# Patient Record
Sex: MALE | Race: BLACK OR AFRICAN AMERICAN | NOT HISPANIC OR LATINO | URBAN - METROPOLITAN AREA
[De-identification: names, ages, dates, MRNs, and addresses within clinical notes are randomized per-mention and may not be internally consistent; named-entity substitution may affect disease eponyms.]

---

## 2023-09-21 ENCOUNTER — EMERGENCY (EMERGENCY)
Facility: HOSPITAL | Age: 48
LOS: 1 days | Discharge: HOME | End: 2023-09-21
Admitting: EMERGENCY MEDICINE
Payer: MEDICAID

## 2023-09-21 VITALS
HEART RATE: 72 BPM | SYSTOLIC BLOOD PRESSURE: 156 MMHG | TEMPERATURE: 99 F | OXYGEN SATURATION: 98 % | DIASTOLIC BLOOD PRESSURE: 86 MMHG | RESPIRATION RATE: 18 BRPM

## 2023-09-21 VITALS
HEART RATE: 60 BPM | OXYGEN SATURATION: 100 % | TEMPERATURE: 98 F | RESPIRATION RATE: 18 BRPM | SYSTOLIC BLOOD PRESSURE: 173 MMHG | DIASTOLIC BLOOD PRESSURE: 95 MMHG | WEIGHT: 139.99 LBS

## 2023-09-21 LAB
ALBUMIN SERPL ELPH-MCNC: 3.6 G/DL — SIGNIFICANT CHANGE UP (ref 3.4–5)
ALP SERPL-CCNC: 72 U/L — SIGNIFICANT CHANGE UP (ref 40–120)
ALT FLD-CCNC: 22 U/L — SIGNIFICANT CHANGE UP (ref 12–42)
ANION GAP SERPL CALC-SCNC: 9 MMOL/L — SIGNIFICANT CHANGE UP (ref 9–16)
AST SERPL-CCNC: 28 U/L — SIGNIFICANT CHANGE UP (ref 15–37)
BASOPHILS # BLD AUTO: 0.03 K/UL — SIGNIFICANT CHANGE UP (ref 0–0.2)
BASOPHILS NFR BLD AUTO: 0.5 % — SIGNIFICANT CHANGE UP (ref 0–2)
BILIRUB SERPL-MCNC: 0.3 MG/DL — SIGNIFICANT CHANGE UP (ref 0.2–1.2)
BUN SERPL-MCNC: 10 MG/DL — SIGNIFICANT CHANGE UP (ref 7–23)
CALCIUM SERPL-MCNC: 9.2 MG/DL — SIGNIFICANT CHANGE UP (ref 8.5–10.5)
CHLORIDE SERPL-SCNC: 103 MMOL/L — SIGNIFICANT CHANGE UP (ref 96–108)
CO2 SERPL-SCNC: 27 MMOL/L — SIGNIFICANT CHANGE UP (ref 22–31)
CREAT SERPL-MCNC: 1.04 MG/DL — SIGNIFICANT CHANGE UP (ref 0.5–1.3)
EGFR: 87 ML/MIN/1.73M2 — SIGNIFICANT CHANGE UP
EOSINOPHIL # BLD AUTO: 0.03 K/UL — SIGNIFICANT CHANGE UP (ref 0–0.5)
EOSINOPHIL NFR BLD AUTO: 0.5 % — SIGNIFICANT CHANGE UP (ref 0–6)
GLUCOSE SERPL-MCNC: 101 MG/DL — HIGH (ref 70–99)
HCT VFR BLD CALC: 50.8 % — HIGH (ref 39–50)
HGB BLD-MCNC: 16.7 G/DL — SIGNIFICANT CHANGE UP (ref 13–17)
IMM GRANULOCYTES NFR BLD AUTO: 0.3 % — SIGNIFICANT CHANGE UP (ref 0–0.9)
LYMPHOCYTES # BLD AUTO: 1.03 K/UL — SIGNIFICANT CHANGE UP (ref 1–3.3)
LYMPHOCYTES # BLD AUTO: 17 % — SIGNIFICANT CHANGE UP (ref 13–44)
MCHC RBC-ENTMCNC: 29.3 PG — SIGNIFICANT CHANGE UP (ref 27–34)
MCHC RBC-ENTMCNC: 32.9 GM/DL — SIGNIFICANT CHANGE UP (ref 32–36)
MCV RBC AUTO: 89.3 FL — SIGNIFICANT CHANGE UP (ref 80–100)
MONOCYTES # BLD AUTO: 0.29 K/UL — SIGNIFICANT CHANGE UP (ref 0–0.9)
MONOCYTES NFR BLD AUTO: 4.8 % — SIGNIFICANT CHANGE UP (ref 2–14)
NEUTROPHILS # BLD AUTO: 4.67 K/UL — SIGNIFICANT CHANGE UP (ref 1.8–7.4)
NEUTROPHILS NFR BLD AUTO: 76.9 % — SIGNIFICANT CHANGE UP (ref 43–77)
NRBC # BLD: 0 /100 WBCS — SIGNIFICANT CHANGE UP (ref 0–0)
PLATELET # BLD AUTO: 257 K/UL — SIGNIFICANT CHANGE UP (ref 150–400)
POTASSIUM SERPL-MCNC: 3.8 MMOL/L — SIGNIFICANT CHANGE UP (ref 3.5–5.3)
POTASSIUM SERPL-SCNC: 3.8 MMOL/L — SIGNIFICANT CHANGE UP (ref 3.5–5.3)
PROT SERPL-MCNC: 7.9 G/DL — SIGNIFICANT CHANGE UP (ref 6.4–8.2)
RBC # BLD: 5.69 M/UL — SIGNIFICANT CHANGE UP (ref 4.2–5.8)
RBC # FLD: 12.4 % — SIGNIFICANT CHANGE UP (ref 10.3–14.5)
SODIUM SERPL-SCNC: 139 MMOL/L — SIGNIFICANT CHANGE UP (ref 132–145)
WBC # BLD: 6.07 K/UL — SIGNIFICANT CHANGE UP (ref 3.8–10.5)
WBC # FLD AUTO: 6.07 K/UL — SIGNIFICANT CHANGE UP (ref 3.8–10.5)

## 2023-09-21 PROCEDURE — 99284 EMERGENCY DEPT VISIT MOD MDM: CPT

## 2023-09-21 RX ORDER — SODIUM CHLORIDE 9 MG/ML
1000 INJECTION INTRAMUSCULAR; INTRAVENOUS; SUBCUTANEOUS ONCE
Refills: 0 | Status: COMPLETED | OUTPATIENT
Start: 2023-09-21 | End: 2023-09-21

## 2023-09-21 RX ORDER — SUCRALFATE 1 G
1 TABLET ORAL ONCE
Refills: 0 | Status: COMPLETED | OUTPATIENT
Start: 2023-09-21 | End: 2023-09-21

## 2023-09-21 RX ORDER — HALOPERIDOL DECANOATE 100 MG/ML
2 INJECTION INTRAMUSCULAR ONCE
Refills: 0 | Status: COMPLETED | OUTPATIENT
Start: 2023-09-21 | End: 2023-09-21

## 2023-09-21 RX ORDER — FAMOTIDINE 10 MG/ML
20 INJECTION INTRAVENOUS ONCE
Refills: 0 | Status: COMPLETED | OUTPATIENT
Start: 2023-09-21 | End: 2023-09-21

## 2023-09-21 RX ADMIN — HALOPERIDOL DECANOATE 2 MILLIGRAM(S): 100 INJECTION INTRAMUSCULAR at 14:07

## 2023-09-21 RX ADMIN — SODIUM CHLORIDE 1000 MILLILITER(S): 9 INJECTION INTRAMUSCULAR; INTRAVENOUS; SUBCUTANEOUS at 14:07

## 2023-09-21 RX ADMIN — FAMOTIDINE 20 MILLIGRAM(S): 10 INJECTION INTRAVENOUS at 14:07

## 2023-09-21 NOTE — ED PROVIDER NOTE - PATIENT PORTAL LINK FT
You can access the FollowMyHealth Patient Portal offered by [facility] by registering at the following website: [email or web address]. By joining [employer]’s FollowMyHealth portal, you will also be able to view your health information using other applications (apps) compatible with our system.

## 2023-09-21 NOTE — ED PROVIDER NOTE - PHYSICAL EXAMINATION
VITAL SIGNS: I have reviewed nursing notes and confirm.   CONST: No apparent distress.  ENT: No nasal discharge; airway clear.  EYES: Sclera clear. Pupils round and symmetrical bilaterally.  RESP: Breathing comfortably; speaking in full sentences.   ABD: Soft; non-distended; non-tender;   MSK: No acute deformities noted to extremities. No tenderness to cervical/thoracic/lumbar spine to palpation.  NEURO: Alert, oriented. Speech is fluent and appropriate. Moving all extremities appropriately. No gross motor or sensory abnormalities.   SKIN: Skin is normal temperature; no diaphoresis; no pallor.   PSYCH: Appropriate mood, language, and behavior.

## 2023-09-21 NOTE — ED PROVIDER NOTE - OBJECTIVE STATEMENT
47 y/o M with Hx of ulcers and HTN, presents for vomiting and abdominal pain since this morning. Patient endorses smoking marijuana everyday. Denies diarrhea.

## 2023-09-21 NOTE — ED PROVIDER NOTE - CLINICAL SUMMARY MEDICAL DECISION MAKING FREE TEXT BOX
49 y/o M presents for abdominal pain and vomiting since this morning. Exam within normal limits. Plan for haldol and Pepcid for symptomatic relief. 49 y/o M presents for abdominal pain and vomiting since this morning in the setting of cannabis use. Exam within normal limits. Plan for haldol and Pepcid for symptomatic relief.

## 2023-09-21 NOTE — ED ADULT NURSE NOTE - OBJECTIVE STATEMENT
Patient presents with complaints of abdominal pain, nausea and actively vomiting in the ED. Endorses smoking marijuana everyday. Denies fever, chills, SOB, CP.

## 2023-09-21 NOTE — ED ADULT TRIAGE NOTE - CHIEF COMPLAINT QUOTE
here for nausea/vomiting- pt actively vomiting in triage and forcing himself to vomit- h/o htn asthma

## 2023-09-23 DIAGNOSIS — I10 ESSENTIAL (PRIMARY) HYPERTENSION: ICD-10-CM

## 2023-09-23 DIAGNOSIS — R10.9 UNSPECIFIED ABDOMINAL PAIN: ICD-10-CM

## 2023-09-23 DIAGNOSIS — R11.2 NAUSEA WITH VOMITING, UNSPECIFIED: ICD-10-CM

## 2023-09-23 DIAGNOSIS — F12.90 CANNABIS USE, UNSPECIFIED, UNCOMPLICATED: ICD-10-CM

## 2023-09-23 DIAGNOSIS — Z87.11 PERSONAL HISTORY OF PEPTIC ULCER DISEASE: ICD-10-CM

## 2024-03-30 ENCOUNTER — EMERGENCY (EMERGENCY)
Facility: HOSPITAL | Age: 49
LOS: 1 days | Discharge: HOME | End: 2024-03-30
Attending: STUDENT IN AN ORGANIZED HEALTH CARE EDUCATION/TRAINING PROGRAM | Admitting: STUDENT IN AN ORGANIZED HEALTH CARE EDUCATION/TRAINING PROGRAM
Payer: COMMERCIAL

## 2024-03-30 ENCOUNTER — EMERGENCY (EMERGENCY)
Facility: HOSPITAL | Age: 49
LOS: 1 days | Discharge: ANOTHER HOSPITAL | End: 2024-03-30
Attending: EMERGENCY MEDICINE | Admitting: EMERGENCY MEDICINE
Payer: MEDICAID

## 2024-03-30 VITALS
RESPIRATION RATE: 16 BRPM | OXYGEN SATURATION: 98 % | HEART RATE: 88 BPM | SYSTOLIC BLOOD PRESSURE: 176 MMHG | TEMPERATURE: 98 F | DIASTOLIC BLOOD PRESSURE: 92 MMHG

## 2024-03-30 VITALS
HEART RATE: 61 BPM | RESPIRATION RATE: 16 BRPM | WEIGHT: 138.01 LBS | DIASTOLIC BLOOD PRESSURE: 110 MMHG | SYSTOLIC BLOOD PRESSURE: 213 MMHG | OXYGEN SATURATION: 99 % | TEMPERATURE: 98 F | HEIGHT: 68 IN

## 2024-03-30 VITALS
TEMPERATURE: 99 F | OXYGEN SATURATION: 98 % | DIASTOLIC BLOOD PRESSURE: 102 MMHG | HEART RATE: 90 BPM | SYSTOLIC BLOOD PRESSURE: 180 MMHG | RESPIRATION RATE: 16 BRPM

## 2024-03-30 LAB
ALBUMIN SERPL ELPH-MCNC: 2.9 G/DL — LOW (ref 3.4–5)
ALP SERPL-CCNC: 80 U/L — SIGNIFICANT CHANGE UP (ref 40–120)
ALT FLD-CCNC: 21 U/L — SIGNIFICANT CHANGE UP (ref 12–42)
ANION GAP SERPL CALC-SCNC: 7 MMOL/L — LOW (ref 9–16)
APPEARANCE UR: CLEAR — SIGNIFICANT CHANGE UP
AST SERPL-CCNC: 22 U/L — SIGNIFICANT CHANGE UP (ref 15–37)
BACTERIA # UR AUTO: ABNORMAL /HPF
BASOPHILS # BLD AUTO: 0.05 K/UL — SIGNIFICANT CHANGE UP (ref 0–0.2)
BASOPHILS NFR BLD AUTO: 0.6 % — SIGNIFICANT CHANGE UP (ref 0–2)
BILIRUB SERPL-MCNC: 0.4 MG/DL — SIGNIFICANT CHANGE UP (ref 0.2–1.2)
BILIRUB UR-MCNC: NEGATIVE — SIGNIFICANT CHANGE UP
BUN SERPL-MCNC: 9 MG/DL — SIGNIFICANT CHANGE UP (ref 7–23)
CALCIUM SERPL-MCNC: 9.1 MG/DL — SIGNIFICANT CHANGE UP (ref 8.5–10.5)
CHLORIDE SERPL-SCNC: 102 MMOL/L — SIGNIFICANT CHANGE UP (ref 96–108)
CO2 SERPL-SCNC: 30 MMOL/L — SIGNIFICANT CHANGE UP (ref 22–31)
COD CRY URNS QL: SIGNIFICANT CHANGE UP
COLOR SPEC: YELLOW — SIGNIFICANT CHANGE UP
CREAT SERPL-MCNC: 1.39 MG/DL — HIGH (ref 0.5–1.3)
DIFF PNL FLD: NEGATIVE — SIGNIFICANT CHANGE UP
EGFR: 62 ML/MIN/1.73M2 — SIGNIFICANT CHANGE UP
EOSINOPHIL # BLD AUTO: 0.06 K/UL — SIGNIFICANT CHANGE UP (ref 0–0.5)
EOSINOPHIL NFR BLD AUTO: 0.7 % — SIGNIFICANT CHANGE UP (ref 0–6)
EPI CELLS # UR: SIGNIFICANT CHANGE UP
GLUCOSE SERPL-MCNC: 100 MG/DL — HIGH (ref 70–99)
GLUCOSE UR QL: NEGATIVE MG/DL — SIGNIFICANT CHANGE UP
GRAN CASTS # UR COMP ASSIST: SIGNIFICANT CHANGE UP
HCT VFR BLD CALC: 41.2 % — SIGNIFICANT CHANGE UP (ref 39–50)
HGB BLD-MCNC: 13.4 G/DL — SIGNIFICANT CHANGE UP (ref 13–17)
HIV 1 & 2 AB SERPL IA.RAPID: SIGNIFICANT CHANGE UP
HYALINE CASTS # UR AUTO: SIGNIFICANT CHANGE UP
IMM GRANULOCYTES NFR BLD AUTO: 0.2 % — SIGNIFICANT CHANGE UP (ref 0–0.9)
KETONES UR-MCNC: NEGATIVE MG/DL — SIGNIFICANT CHANGE UP
LEUKOCYTE ESTERASE UR-ACNC: ABNORMAL
LYMPHOCYTES # BLD AUTO: 1.13 K/UL — SIGNIFICANT CHANGE UP (ref 1–3.3)
LYMPHOCYTES # BLD AUTO: 12.6 % — LOW (ref 13–44)
MCHC RBC-ENTMCNC: 28.5 PG — SIGNIFICANT CHANGE UP (ref 27–34)
MCHC RBC-ENTMCNC: 32.5 GM/DL — SIGNIFICANT CHANGE UP (ref 32–36)
MCV RBC AUTO: 87.5 FL — SIGNIFICANT CHANGE UP (ref 80–100)
MONOCYTES # BLD AUTO: 1.08 K/UL — HIGH (ref 0–0.9)
MONOCYTES NFR BLD AUTO: 12 % — SIGNIFICANT CHANGE UP (ref 2–14)
NEUTROPHILS # BLD AUTO: 6.64 K/UL — SIGNIFICANT CHANGE UP (ref 1.8–7.4)
NEUTROPHILS NFR BLD AUTO: 73.9 % — SIGNIFICANT CHANGE UP (ref 43–77)
NITRITE UR-MCNC: NEGATIVE — SIGNIFICANT CHANGE UP
NRBC # BLD: 0 /100 WBCS — SIGNIFICANT CHANGE UP (ref 0–0)
PH UR: 8.5 (ref 5–8)
PLATELET # BLD AUTO: 331 K/UL — SIGNIFICANT CHANGE UP (ref 150–400)
POTASSIUM SERPL-MCNC: 4.3 MMOL/L — SIGNIFICANT CHANGE UP (ref 3.5–5.3)
POTASSIUM SERPL-SCNC: 4.3 MMOL/L — SIGNIFICANT CHANGE UP (ref 3.5–5.3)
PROT SERPL-MCNC: 8.1 G/DL — SIGNIFICANT CHANGE UP (ref 6.4–8.2)
PROT UR-MCNC: NEGATIVE MG/DL — SIGNIFICANT CHANGE UP
RBC # BLD: 4.71 M/UL — SIGNIFICANT CHANGE UP (ref 4.2–5.8)
RBC # FLD: 12.6 % — SIGNIFICANT CHANGE UP (ref 10.3–14.5)
RBC CASTS # UR COMP ASSIST: 1 /HPF — SIGNIFICANT CHANGE UP (ref 0–4)
SODIUM SERPL-SCNC: 139 MMOL/L — SIGNIFICANT CHANGE UP (ref 132–145)
SP GR SPEC: 1.01 — SIGNIFICANT CHANGE UP (ref 1–1.03)
TRI-PHOS CRY UR QL COMP ASSIST: SIGNIFICANT CHANGE UP
URATE CRY FLD QL MICRO: SIGNIFICANT CHANGE UP
UROBILINOGEN FLD QL: 0.2 MG/DL — SIGNIFICANT CHANGE UP (ref 0.2–1)
WBC # BLD: 8.98 K/UL — SIGNIFICANT CHANGE UP (ref 3.8–10.5)
WBC # FLD AUTO: 8.98 K/UL — SIGNIFICANT CHANGE UP (ref 3.8–10.5)
WBC UR QL: >50 /HPF — HIGH (ref 0–5)

## 2024-03-30 PROCEDURE — 99284 EMERGENCY DEPT VISIT MOD MDM: CPT

## 2024-03-30 PROCEDURE — 76870 US EXAM SCROTUM: CPT

## 2024-03-30 PROCEDURE — 96374 THER/PROPH/DIAG INJ IV PUSH: CPT

## 2024-03-30 PROCEDURE — 99285 EMERGENCY DEPT VISIT HI MDM: CPT

## 2024-03-30 PROCEDURE — 76870 US EXAM SCROTUM: CPT | Mod: 26

## 2024-03-30 PROCEDURE — 99284 EMERGENCY DEPT VISIT MOD MDM: CPT | Mod: 25

## 2024-03-30 RX ORDER — LEVOFLOXACIN 5 MG/ML
1 INJECTION, SOLUTION INTRAVENOUS
Qty: 10 | Refills: 0
Start: 2024-03-30 | End: 2024-04-08

## 2024-03-30 RX ORDER — CEFTRIAXONE 500 MG/1
500 INJECTION, POWDER, FOR SOLUTION INTRAMUSCULAR; INTRAVENOUS ONCE
Refills: 0 | Status: COMPLETED | OUTPATIENT
Start: 2024-03-30 | End: 2024-03-30

## 2024-03-30 RX ORDER — ONDANSETRON 8 MG/1
4 TABLET, FILM COATED ORAL ONCE
Refills: 0 | Status: COMPLETED | OUTPATIENT
Start: 2024-03-30 | End: 2024-03-30

## 2024-03-30 RX ORDER — SODIUM CHLORIDE 9 MG/ML
1000 INJECTION INTRAMUSCULAR; INTRAVENOUS; SUBCUTANEOUS ONCE
Refills: 0 | Status: COMPLETED | OUTPATIENT
Start: 2024-03-30 | End: 2024-03-30

## 2024-03-30 RX ORDER — METOCLOPRAMIDE HCL 10 MG
10 TABLET ORAL ONCE
Refills: 0 | Status: DISCONTINUED | OUTPATIENT
Start: 2024-03-30 | End: 2024-03-30

## 2024-03-30 RX ORDER — KETOROLAC TROMETHAMINE 30 MG/ML
30 SYRINGE (ML) INJECTION ONCE
Refills: 0 | Status: DISCONTINUED | OUTPATIENT
Start: 2024-03-30 | End: 2024-03-30

## 2024-03-30 RX ORDER — ACETAMINOPHEN 500 MG
1000 TABLET ORAL ONCE
Refills: 0 | Status: COMPLETED | OUTPATIENT
Start: 2024-03-30 | End: 2024-03-30

## 2024-03-30 RX ORDER — PENICILLIN G BENZATHINE 1200000 [IU]/2ML
2.4 INJECTION, SUSPENSION INTRAMUSCULAR ONCE
Refills: 0 | Status: COMPLETED | OUTPATIENT
Start: 2024-03-30 | End: 2024-03-30

## 2024-03-30 RX ORDER — METOCLOPRAMIDE HCL 10 MG
10 TABLET ORAL ONCE
Refills: 0 | Status: COMPLETED | OUTPATIENT
Start: 2024-03-30 | End: 2024-03-30

## 2024-03-30 RX ADMIN — Medication 400 MILLIGRAM(S): at 21:41

## 2024-03-30 RX ADMIN — CEFTRIAXONE 500 MILLIGRAM(S): 500 INJECTION, POWDER, FOR SOLUTION INTRAMUSCULAR; INTRAVENOUS at 19:57

## 2024-03-30 RX ADMIN — Medication 100 MILLIGRAM(S): at 20:03

## 2024-03-30 RX ADMIN — Medication 104 MILLIGRAM(S): at 20:38

## 2024-03-30 RX ADMIN — ONDANSETRON 4 MILLIGRAM(S): 8 TABLET, FILM COATED ORAL at 20:18

## 2024-03-30 RX ADMIN — Medication 30 MILLIGRAM(S): at 19:58

## 2024-03-30 RX ADMIN — PENICILLIN G BENZATHINE 2.4 MILLION UNIT(S): 1200000 INJECTION, SUSPENSION INTRAMUSCULAR at 19:56

## 2024-03-30 RX ADMIN — SODIUM CHLORIDE 1000 MILLILITER(S): 9 INJECTION INTRAMUSCULAR; INTRAVENOUS; SUBCUTANEOUS at 20:18

## 2024-03-30 NOTE — ED PROVIDER NOTE - OBJECTIVE STATEMENT
48 year old male with history of HTN presenting as transfer from [facility] ED for b/l testicular pain x 3d. States having swelling and pain to both testicles, onset 3d ago but much worse today, associated with some vomiting, also feels that when he is ejaculating "not all of it is coming out", but denies fevers, chills, recent trauma. Was given IM CTX dose, 100mg PO doxy dose, and IM PCN dose for empiric STD coverage as requested by patient. Sent here for testicular US for definitive diagnosis. Pain currently 2/10.

## 2024-03-30 NOTE — ED PROVIDER NOTE - NSFOLLOWUPCLINICS_GEN_ALL_ED_FT
[facility]rology [facility]  Urology  [address] [place], 3rd [address]  [place], [place] [address]  Phone: [phone]  Fax:

## 2024-03-30 NOTE — ED ADULT NURSE NOTE - NSFALLUNIVINTERV_ED_ALL_ED
Bed/Stretcher in lowest position, wheels locked, appropriate side rails in place/Call bell, personal items and telephone in reach/Instruct patient to call for assistance before getting out of bed/chair/stretcher/Non-slip footwear applied when patient is off stretcher/[place] to call system/Physically safe environment - no spills, clutter or unnecessary equipment/Purposeful proactive rounding/Room/bathroom lighting operational, light cord in reach

## 2024-03-30 NOTE — ED PROVIDER NOTE - PROGRESS NOTE DETAILS
Patient could not tolerate ultrasound and ultrasound tech left.  Spoke with Dr. Short, ED attending at [facility] accepts patient for stat ultrasound. Patient is actively vomiting EMS crews structure.  Line was placed and given Zofran.  CBC and CMP ordered.  CBC is unremarkable, no WBC or left shift appreciated.  CMP is still pending.  UA reviewed  After Zofran patient appeared well, however when he was placed on the stretcher, started vomiting again.  10 mg of IV Reglan ordered.

## 2024-03-30 NOTE — ED PROVIDER NOTE - PHYSICAL EXAMINATION
General: well developed, well nourished, no distress  Eye: bilateral: PERRL, EOMI  Ears, Nose, Throat: normal pharynx, TMs normal, membranes moist.  Neck: non-tender, full range of motion, supple.  Negative For: lymphadenopathy (R), lymphadenopathy (L)  Respiratory: CTAB.  Cardiovascular: S1-S2 normal, regular rate, regular rhythm.  Abdomen: normal bowel sounds, non tender, soft.    Genitourinary: Negative For: CVA tenderness  b/l testicular swelling appreciated, with erythema and warmth  Musculoskeletal: normal gait.  Negative For: back pain, upper, back pain  Extremities: normal range of motion, non-tender.  Negative For: edema (R), edema (L), calf tenderness (R), calf tenderness (L), swelling  Extremity Strength: upper extremities equal bilateral: 5/5, lower extremities equal bilateral: 5/5  Neurologic: alert, oriented to person, oriented to place, oriented to time.    Skin: normal color.  Negative For: rash  Psychiatric: normal affect, normal insight, normal concentration

## 2024-03-30 NOTE — ED PROVIDER NOTE - NSFOLLOWUPINSTRUCTIONS_ED_ALL_ED_FT
You were seen in the Emergency Department for:    You were prescribed to the pharmacy:  Please follow the instructions on the container/label and ask your pharmacist for any questions/concerns.    For pain, you may take Tylenol (acetaminophen) 975 mg every 6 hours, AND/OR Advil (ibuprofen) 600 mg every 8 hours.    Please follow up with your primary physician. If you do not have a primary physician or specialist of your needs, please call [phone] to find one convenient for you. At this number you will be able to locate a provider who accepts your insurance, as well as locate the right specialist for your needs.    You should return to the Emergency Department if you feel any new/worsening/persistent symptoms including but not limited to: fever, chills, vomiting, chest pain, difficulty breathing, loss of consciousness, bleeding, uncontrolled pain, numbness/weakness of a body part You were seen in the Emergency Department for:    You were prescribed to the pharmacy:  Please follow the instructions on the container/label and ask your pharmacist for any questions/concerns.    For pain, you may take Tylenol (acetaminophen) 975 mg every 6 hours, AND/OR Advil (ibuprofen) 600 mg every 8 hours.    Please follow up with your primary physician. If you do not have a primary physician or specialist of your needs, please call [phone] to find one convenient for you. At this number you will be able to locate a provider who accepts your insurance, as well as locate the right specialist for your needs.    You should return to the Emergency Department if you feel any new/worsening/persistent symptoms including but not limited to: fever, chills, vomiting, chest pain, difficulty breathing, loss of consciousness, bleeding, uncontrolled pain, numbness/weakness of a body part          Epididymitis  The male reproductive organ, showing the epididymis and the testicle.  Epididymitis is inflammation or swelling of the epididymis. This is caused by an infection. The epididymis is a cord-like structure that is located along the top and back part of the testicle. It collects and stores sperm from the testicle.    This condition can also cause pain and swelling of the testicle and scrotum. Symptoms usually start suddenly (acute epididymitis). Sometimes epididymitis starts gradually and lasts for a while (chronic epididymitis). Chronic epididymitis may be harder to treat.    What are the causes?  In men ages 20–40, this condition is usually caused by a bacterial infection or a sexually transmitted infection (STI), such as gonorrhea or chlamydia.    In men 40 and older, this condition is usually caused by bacteria from a urinary blockage or from abnormalities in the urinary system. These can result from:  Having a tube placed into the bladder (urinary catheter).  Having an enlarged or inflamed prostate gland.  Having recently had urinary tract surgery.  Having a problem with a backward flow of urine (retrograde).  In men who have a condition that weakens the body's defense system (immune system), such as human immunodeficiency virus (HIV), this condition can be caused by:  Other bacteria, including tuberculosis and syphilis.  Viruses.  Fungi.  Sometimes this condition occurs without infection. This may happen because of trauma or repetitive activities such as sports.    What increases the risk?  You are more likely to develop this condition if you have:  Unprotected sex with more than one partner.  Anal sex.  Had recent surgery.  A urinary catheter.  Urinary problems.  A suppressed immune system.  What are the signs or symptoms?  This condition usually begins suddenly with chills, fever, and pain behind the scrotum and in the testicle. Other symptoms include:  Swelling of the scrotum, testicle, or both.  Pain when ejaculating or urinating.  Pain in the back or abdomen.  Nausea.  Itching and discharge from the penis.  A frequent need to pass urine.  Redness, increased warmth, and tenderness of the scrotum.  How is this diagnosed?  Your health care provider can diagnose this condition based on your symptoms and medical history. Your health care provider will also do a physical exam to check your scrotum and testicle for swelling, pain, and redness. You may also have other tests, including:  Testing of discharge from the penis.  Testing your urine for infections, such as STIs.  Ultrasound to check for blood flow and inflammation.  Your health care provider may test you for other STIs, including HIV.    How is this treated?  Treatment for this condition depends on the cause. If your condition is caused by a bacterial infection, oral antibiotic medicine may be prescribed. If the bacterial infection has spread to your blood, you may need to receive IV antibiotics.    For both bacterial and nonbacterial epididymitis, you may be treated with:  Rest.  Elevation of the scrotum.  Pain medicines.  Anti-inflammatory medicines.  Surgery may be needed if:  You have pus buildup in the scrotum (abscess).  You have epididymitis that has not responded to other treatments.  Follow these instructions at home:  Medicines    Take over-the-counter and prescription medicines only as told by your health care provider.  If you were prescribed an antibiotic medicine, take it as told by your health care provider. Do not stop taking the antibiotic even if your condition improves.  Sexual activity    If your epididymitis was caused by an STI, avoid sexual activity until your treatment is complete.  Inform your sexual partner or partners if you test positive for an STI. They may need to be treated. Do not engage in sexual activity with your partner or partners until their treatment is completed.  Managing pain and swelling    A bathtub partially filled with water.  If directed, raise (elevate) your scrotum and apply ice. To do this:  Put ice in a plastic bag.  Place a small towel or pillow between your legs.  Rest your scrotum on the pillow or towel.  Place another towel between your skin and the plastic bag.  Leave the ice on for 20 minutes, 2–3 times a day.  Remove the ice if your skin turns bright red. This is very important. If you cannot feel pain, heat, or cold, you have a greater risk of damage to the area.  Keep your scrotum elevated and supported while resting. Ask your health care provider if you should wear a scrotal support, such as a jockstrap. Wear it as told by your health care provider.  Try taking a sitz bath to help with discomfort. This is a warm water bath that is taken while you are sitting down. The water should come up to your hips and should cover your buttocks. Do this 3–4 times per day or as told by your health care provider.  General instructions    Drink enough fluid to keep your urine pale yellow.  Return to your normal activities as told by your health care provider. Ask your health care provider what activities are safe for you.  Keep all follow-up visits. This is important.  Contact a health care provider if:  You have a fever.  Your pain medicine is not helping.  Your pain is getting worse.  Your symptoms do not improve within 3 days.  Summary  Epididymitis is inflammation or swelling of the epididymis. This is caused by an infection. This condition can also cause pain and swelling of the testicle and scrotum.  Treatment for this condition depends on the cause. If your condition is caused by a bacterial infection, oral antibiotic medicine may be prescribed.  Inform your sexual partner or partners if you test positive for an STI. They may need to be treated. Do not engage in sexual activity with your partner or partners until their treatment is completed.  Contact a health care provider if your symptoms do not improve within 3 days.  This information is not intended to replace advice given to you by your health care provider. Make sure you discuss any questions you have with your health care provider.

## 2024-03-30 NOTE — ED ADULT NURSE NOTE - NSICDXPASTMEDICALHX_GEN_ALL_CORE_FT
[facility]  Progress Note     Derian Mclaughlin Patient Status:  Inpatient    1931 MRN [id number]   [facility]NE-A Attending Randall Chang MD   Hosp Day # 1 PCP Austin Andrade MD     Chief Complaint: sob  S: Patient sob stil for input(s): PTP, INR in the last 168 hours. No results for input(s): TROP, CK in the last 168 hours. Imaging: Imaging data reviewed in Epic.     Medications:   • aspirin  81 mg Oral Nightly   • Clopidogrel Bisulfate  75 mg Oral Daily   • Levoth PAST MEDICAL HISTORY:  HTN (hypertension)

## 2024-03-30 NOTE — ED ADULT NURSE NOTE - OBJECTIVE STATEMENT
Patient reports intermittent bilateral lower back testicular pain worsening x 1 week. Denies any penile discharge. Slight discomfort on urination.

## 2024-03-30 NOTE — ED PROVIDER NOTE - CLINICAL SUMMARY MEDICAL DECISION MAKING FREE TEXT BOX
48-year-old male presents this emergency room for testicular pain since 7 AM this morning (13 hours prior to arrival), and hematospermia for 3 days.  On arrival patient's vitals are sitting for a blood pressure of 180/102, is noncompliant with his hypertension medications and is in similar 10 pain.  Is requesting STD testing and treatment  Is a testing and treatment ordered  Testicular ultrasound ordered.

## 2024-03-30 NOTE — ED ADULT NURSE REASSESSMENT NOTE - NS ED NURSE REASSESS COMMENT FT1
Noted nausea - MD aware zofran given per order. Pt transferring to main campus for ultrasound. urine obtained and sent.

## 2024-03-30 NOTE — ED PROVIDER NOTE - CLINICAL SUMMARY MEDICAL DECISION MAKING FREE TEXT BOX
48 year old male with history of HTN presenting as transfer from [facility] ED for b/l testicular pain x 3d--sent for US study for definitive diagnosis and r/o torsion. High suspicion for epididymitis. Pain currently well controlled. Afebrile. Noted very hypertensive here on arrival but has known history and I suspect poor medication adherence. No evidence of end organ dysfunction at this time. If US confirms epididymo-orchitis would plan for treatment with levaquin 500mg QD x 10d (already received IM CTX dose @ OSH for gonorrhea coverage), and outpatient f/u with urology and PMD.

## 2024-03-30 NOTE — ED PROVIDER NOTE - OBJECTIVE STATEMENT
48-year-old male, medical history of hypertension, presents this emergency department for bilateral testicular pain and swelling. 48-year-old male, medical history of hypertension, presents this emergency department for bilateral testicular pain and swelling. Patient states that he started transiently 3 days ago, and has progressively gotten worse.  States that this morning he started developing some testicular pain around 7 AM.  Denies any urinary symptoms.

## 2024-03-30 NOTE — ED PROVIDER NOTE - PHYSICAL EXAMINATION
Gen - NAD; well-appearing; A+Ox3   HEENT - NCAT, EOMI, moist mucous membranes  Neck - supple  Resp - CTAB, no increased WOB  CV -  RRR, no m/r/g  Abd - soft, NT, ND; no guarding or rebound  MSK - FROM of b/l UE and LE, no gross deformities  Extrem - no LE edema/erythema/tenderness  Neuro - no focal motor or sensation deficits  Skin - warm, well perfused   - b/l testicular swelling, minimally tender to touch, normal penis, no urethral discharge noted

## 2024-03-30 NOTE — ED ADULT TRIAGE NOTE - CHIEF COMPLAINT QUOTE
Pt states "it has been red when I ejaculate for a few days now and not all the stuff comes out. My testicles are swollen today". Increased n/v this afternoon. hx htn

## 2024-03-30 NOTE — ED PROVIDER NOTE - CARE PLAN
Principal Discharge DX:	Testicular pain   1 Principal Discharge DX:	Testicular pain  Secondary Diagnosis:	Epididymitis

## 2024-03-30 NOTE — ED ADULT NURSE NOTE - NSFALLUNIVINTERV_ED_ALL_ED
Bed/Stretcher in lowest position, wheels locked, appropriate side rails in place/Call bell, personal items and telephone in reach/Instruct patient to call for assistance before getting out of bed/chair/stretcher/Non-slip footwear applied when patient is off stretcher/[place] to call system/Physically safe environment - no spills, clutter or unnecessary equipment/Purposeful proactive rounding/Room/bathroom lighting operational, light cord in reach

## 2024-03-31 VITALS
SYSTOLIC BLOOD PRESSURE: 117 MMHG | HEART RATE: 62 BPM | RESPIRATION RATE: 16 BRPM | DIASTOLIC BLOOD PRESSURE: 94 MMHG | OXYGEN SATURATION: 99 %

## 2024-03-31 PROBLEM — I10 ESSENTIAL (PRIMARY) HYPERTENSION: Chronic | Status: ACTIVE | Noted: 2023-09-21

## 2024-03-31 LAB — T PALLIDUM AB TITR SER: NEGATIVE — SIGNIFICANT CHANGE UP

## 2024-04-01 DIAGNOSIS — N50.811 RIGHT TESTICULAR PAIN: ICD-10-CM

## 2024-04-01 DIAGNOSIS — R11.10 VOMITING, UNSPECIFIED: ICD-10-CM

## 2024-04-01 DIAGNOSIS — N50.812 LEFT TESTICULAR PAIN: ICD-10-CM

## 2024-04-01 DIAGNOSIS — N45.1 EPIDIDYMITIS: ICD-10-CM

## 2024-04-01 DIAGNOSIS — I10 ESSENTIAL (PRIMARY) HYPERTENSION: ICD-10-CM

## 2024-04-01 LAB
C TRACH RRNA SPEC QL NAA+PROBE: SIGNIFICANT CHANGE UP
CULTURE RESULTS: SIGNIFICANT CHANGE UP
N GONORRHOEA RRNA SPEC QL NAA+PROBE: SIGNIFICANT CHANGE UP
SPECIMEN SOURCE: SIGNIFICANT CHANGE UP
SPECIMEN SOURCE: SIGNIFICANT CHANGE UP

## 2024-04-02 DIAGNOSIS — N50.812 LEFT TESTICULAR PAIN: ICD-10-CM

## 2024-04-02 DIAGNOSIS — N50.811 RIGHT TESTICULAR PAIN: ICD-10-CM

## 2024-04-02 DIAGNOSIS — N50.89 OTHER SPECIFIED DISORDERS OF THE MALE GENITAL ORGANS: ICD-10-CM

## 2024-04-03 ENCOUNTER — EMERGENCY (EMERGENCY)
Facility: HOSPITAL | Age: 49
LOS: 1 days | Discharge: HOME | End: 2024-04-03
Attending: EMERGENCY MEDICINE | Admitting: EMERGENCY MEDICINE
Payer: MEDICAID

## 2024-04-03 VITALS
OXYGEN SATURATION: 95 % | TEMPERATURE: 98 F | SYSTOLIC BLOOD PRESSURE: 190 MMHG | HEART RATE: 85 BPM | RESPIRATION RATE: 14 BRPM | DIASTOLIC BLOOD PRESSURE: 94 MMHG

## 2024-04-03 VITALS
TEMPERATURE: 98 F | SYSTOLIC BLOOD PRESSURE: 165 MMHG | DIASTOLIC BLOOD PRESSURE: 95 MMHG | HEART RATE: 81 BPM | OXYGEN SATURATION: 97 % | RESPIRATION RATE: 16 BRPM

## 2024-04-03 DIAGNOSIS — R41.82 ALTERED MENTAL STATUS, UNSPECIFIED: ICD-10-CM

## 2024-04-03 DIAGNOSIS — T40.1X1A POISONING BY HEROIN, ACCIDENTAL (UNINTENTIONAL), INITIAL ENCOUNTER: ICD-10-CM

## 2024-04-03 PROCEDURE — 99284 EMERGENCY DEPT VISIT MOD MDM: CPT

## 2024-04-03 RX ORDER — LEVOFLOXACIN 5 MG/ML
500 INJECTION, SOLUTION INTRAVENOUS ONCE
Refills: 0 | Status: COMPLETED | OUTPATIENT
Start: 2024-04-03 | End: 2024-04-03

## 2024-04-03 RX ORDER — LEVOFLOXACIN 5 MG/ML
1 INJECTION, SOLUTION INTRAVENOUS
Qty: 20 | Refills: 0
Start: 2024-04-03 | End: 2024-04-12

## 2024-04-03 RX ADMIN — LEVOFLOXACIN 500 MILLIGRAM(S): 5 INJECTION, SOLUTION INTRAVENOUS at 18:08

## 2024-04-03 NOTE — ED ADULT NURSE NOTE - NSFALLUNIVINTERV_ED_ALL_ED
Bed/Stretcher in lowest position, wheels locked, appropriate side rails in place/Call bell, personal items and telephone in reach/Instruct patient to call for assistance before getting out of bed/chair/stretcher/Non-slip footwear applied when patient is off stretcher/[place] to call system/Physically safe environment - no spills, clutter or unnecessary equipment/Purposeful proactive rounding/Room/bathroom lighting operational, light cord in reach

## 2024-04-03 NOTE — ED PROVIDER NOTE - NSFOLLOWUPINSTRUCTIONS_ED_ALL_ED_FT
Drug Overdose  A drug overdose happens when you take too much of a drug. An overdose can occur with illegal drugs, prescription drugs, or over-the-counter (OTC) drugs.    The effects of a drug overdose can be mild, dangerous, or even deadly.    What are the causes?  This condition may be caused by:    Taking too much of a drug by accident.  Taking too much of a drug on purpose.  An error made by a health care provider who prescribes a drug.  An error made by the pharmacist who fills the prescription order.    Drugs that commonly cause overdose include:    Mental health drugs.  Pain medicines.  Illegal drugs.  OTC cough and cold medicines.  Heart medicines.  Seizure medicines.    What increases the risk?  A drug overdose is more likely in:    Children. They may be attracted to colorful pills. Because of a child's small size, even a small amount of a drug can be dangerous.  Elderly people. They may be taking many different drugs. Elderly people may have difficulty reading labels or remembering when they last took their medicine.    The risk of a drug overdose is also higher for someone who:    Takes illegal drugs.  Takes a drug and drinks alcohol.  Has a mental health condition.    What are the signs or symptoms?  Symptoms of a drug overdose depend on the drug and the amount that was taken. Common danger signs include:    Behavior changes.  Sleepiness.  Slowed breathing.  Nausea and vomiting.  Seizures.  Changes in eye pupil size. The pupil may be very large or very small.    If there are signs and symptoms of very low blood pressure (shock) from an overdose, emergency treatment is required. These include:    Cold and clammy skin.  Pale skin.  Blue lips.  Very slow breathing.  Extreme sleepiness.  Loss of consciousness.    How is this diagnosed?  This condition may be diagnosed based on your symptoms. It is important to tell your health care provider:    All of the drugs that you took.  When you took the drugs.  Whether you were drinking alcohol.    Your health care provider will do a physical exam. This exam may include:    Checking and monitoring your heart rate and rhythm, your temperature, and your blood pressure (vital signs).  Checking your breathing and oxygen level.    You may also have tests, including:    Urine tests to check for drugs in your system.  Blood tests to check for:    Drugs in your system.  Signs of an imbalance of your blood minerals (electrolytes).  Liver damage.  Kidney damage.      How is this treated?  Supporting your vital signs and your breathing is the first step in treating a drug overdose. Treatment may also include:    Giving fluids and electrolytes through an IV tube.  Inserting a breathing tube (endotracheal tube) in your airway to help you breathe.  Passing a tube through your nose and into your stomach (NG tube, or nasogastric tube) to wash out your stomach.  Giving medicines that:    Make you vomit.  Absorb any medicine that is left in your digestive system.  Block or reverse the effect of the drug that caused the overdose.    Filtering your blood through an artificial kidney machine (hemodialysis). You may need this if your overdose is severe or if you have kidney failure.  Ongoing counseling and mental health support if you intentionally overdosed or used an illegal drug.    Follow these instructions at home:  Take medicines only as directed by your health care provider. Always ask your health care provider about possible side effects of any new drug that you start taking.  Keep a list of all of the drugs that you take, including over-the-counter medicines. Bring this list with you to all of your medical visits.  Drink enough fluid to keep your urine clear or pale yellow.  Keep all follow-up visits as directed by your health care provider. This is important.  How is this prevented?  Get help if you are struggling with:    Alcohol or drug use.  Depression or another mental health problem.    Keep the phone number of your local poison control center near your phone or on your cell phone.  Store all medicines in safety containers that are out of the reach of children.  Read the drug inserts that come with your medicines.  Do not use illegal drugs.  Do not drink alcohol when taking drugs.  Do not take medicines that are not prescribed for you.  Contact a health care provider if:  Your symptoms return.  You develop new symptoms or side effects when you take medicines.  Get help right away if:  You think that you or someone else may have taken too much of a drug. The hotline of the National Poison Control Center is (585) 615-2908.  You or someone else is having symptoms of a drug overdose.  You have serious thoughts about hurting yourself or others.  You become confused.  You have:    Chest pain.  Difficulty breathing.  A loss of consciousness.    Drug overdose is an emergency. Do not wait to see if the symptoms will go away. Get medical help right away. Call your local emergency services (911 in the [place] Do not drive yourself to the hospital.     This information is not intended to replace advice given to you by your health care provider. Make sure you discuss any questions you have with your health care provider.

## 2024-04-03 NOTE — ED ADULT NURSE NOTE - CHIEF COMPLAINT QUOTE
patient BIBA from [facility] for heroin overdose; was given 2mg IN narcan; vss

## 2024-04-03 NOTE — ED PROVIDER NOTE - OBJECTIVE STATEMENT
52 yo M presents to the ED by EMS for overdose. History obtained by EMS states that patient went to [facility] and overdosed on heroin and woke up after given intranasal narcan. No falls or trauma.

## 2024-04-03 NOTE — ED ADULT NURSE NOTE - OBJECTIVE STATEMENT
Patient resting comfortably on stretcher with capnography in place. Patient denied heroin use. Regular respiratory rate and rhythm noted. Patient reports he lost antibiotics prescribed after admission for "swollen nuts." MD made aware.

## 2024-04-03 NOTE — ED ADULT NURSE REASSESSMENT NOTE - NS ED NURSE REASSESS COMMENT FT1
Patient upset that he couldn't stay longer, security called and escorted patient off unit. Patient upset that discharge papers noted OD and ripped up the signature page of discharge papers that he already signed.

## 2024-04-03 NOTE — ED PROVIDER NOTE - PHYSICAL EXAMINATION
Const: No apparent distress  Eyes: PERRL, no conjunctival injection  HENT:  Neck supple without meningismus, no sings of trauma    CV: RRR, Warm, well-perfused extremities  RESP: CTA B/L, no tachypnea   GI: soft, non-tender, non-distended  MSK: No gross deformities appreciated  Skin: Warm, dry. No rashes  Neuro: Alert,

## 2024-04-03 NOTE — ED PROVIDER NOTE - PROGRESS NOTE DETAILS
patient awake and states that he did not  his prescription from when he was in the ED on March 30th. This chart has patient's incorrect name and birthday. Reviewed chart MRN [id number] and patient was diagnosed with epididymitis and was discharged home with levofloxacin which he states he did fill. Will give dose of levofloxacin in the ED and resend the prescription.

## 2024-04-03 NOTE — ED PROVIDER NOTE - PATIENT PORTAL LINK FT
You can access the FollowMyHealth Patient Portal offered by [facility] by registering at the following website: [email or web address]. By joining [employer]’s FollowMyHealth portal, you will also be able to view your health information using other applications (apps) compatible with our system.

## 2024-04-03 NOTE — ED ADULT TRIAGE NOTE - CHIEF COMPLAINT QUOTE
patient BIBA from [facility] for heroin overdose; was given 2mg IN narcan; vss

## 2024-07-23 NOTE — ED ADULT TRIAGE NOTE - BSA (M2)
1.75 [0] : 2) Feeling down, depressed, or hopeless: Not at all (0) [Have you ever fainted, passed out or had an unexplained seizure suddenly and without warning, especially during exercise or in response] : Have you ever fainted, passed out or had an unexplained seizure suddenly and without warning, especially during exercise or in response to sudden loud noises such as doorbells, alarm clocks and ringing telephones? No [Have you ever had exercise-related chest pain or shortness of breath?] : Have you ever had exercise-related chest pain or shortness of breath? No [Has anyone in your immediate family (parents, grandparents, siblings) or other more distant relatives (aunts, uncles, cousins)  of heart] : Has anyone in your immediate family (parents, grandparents, siblings) or other more distant relatives (aunts, uncles, cousins)  of heart problems or had an unexpected sudden death before age 50 (This would include unexpected drownings, unexplained car accidents in which the relative was driving or sudden infant death syndrome.)? No [Are you related to anyone with hypertrophic cardiomyopathy or hypertrophic obstructive cardiomyopathy, Marfan syndrome, arrhythmogenic] : Are you related to anyone with hypertrophic cardiomyopathy or hypertrophic obstructive cardiomyopathy, Marfan syndrome, arrhythmogenic right ventricular cardiomyopathy, long QT syndrome, short QT syndrome, Brugada syndrome or catecholaminergic polymorphic ventricular tachycardia, or anyone younger than 50 years with a pacemaker or implantable defibrillator? No [No Increased risk of SCA or SCD] : No Increased risk of SCA or SCD    [TextEntry] : Patient was screened by cardiology due to her palpitations.

## 2024-09-01 ENCOUNTER — INPATIENT (INPATIENT)
Facility: HOSPITAL | Age: 49
LOS: 10 days | Discharge: NURSING, REHAB OR LONG-TERM CARE | End: 2024-09-12
Attending: STUDENT IN AN ORGANIZED HEALTH CARE EDUCATION/TRAINING PROGRAM | Admitting: STUDENT IN AN ORGANIZED HEALTH CARE EDUCATION/TRAINING PROGRAM
Payer: COMMERCIAL

## 2024-09-01 VITALS
RESPIRATION RATE: 18 BRPM | DIASTOLIC BLOOD PRESSURE: 104 MMHG | HEART RATE: 111 BPM | SYSTOLIC BLOOD PRESSURE: 173 MMHG | TEMPERATURE: 99 F | OXYGEN SATURATION: 96 % | WEIGHT: 141.98 LBS

## 2024-09-01 DIAGNOSIS — F10.939 ALCOHOL USE, UNSPECIFIED WITH WITHDRAWAL, UNSPECIFIED: ICD-10-CM

## 2024-09-01 DIAGNOSIS — L03.90 CELLULITIS, UNSPECIFIED: ICD-10-CM

## 2024-09-01 DIAGNOSIS — A41.9 SEPSIS, UNSPECIFIED ORGANISM: ICD-10-CM

## 2024-09-01 DIAGNOSIS — Z29.9 ENCOUNTER FOR PROPHYLACTIC MEASURES, UNSPECIFIED: ICD-10-CM

## 2024-09-01 DIAGNOSIS — I10 ESSENTIAL (PRIMARY) HYPERTENSION: ICD-10-CM

## 2024-09-01 DIAGNOSIS — F19.10 OTHER PSYCHOACTIVE SUBSTANCE ABUSE, UNCOMPLICATED: ICD-10-CM

## 2024-09-01 DIAGNOSIS — I16.0 HYPERTENSIVE URGENCY: ICD-10-CM

## 2024-09-01 LAB
ALBUMIN SERPL ELPH-MCNC: 3.3 G/DL — LOW (ref 3.4–5)
ALP SERPL-CCNC: 81 U/L — SIGNIFICANT CHANGE UP (ref 40–120)
ALT FLD-CCNC: 25 U/L — SIGNIFICANT CHANGE UP (ref 12–42)
ANION GAP SERPL CALC-SCNC: 9 MMOL/L — SIGNIFICANT CHANGE UP (ref 5–17)
ANION GAP SERPL CALC-SCNC: 9 MMOL/L — SIGNIFICANT CHANGE UP (ref 9–16)
AST SERPL-CCNC: 25 U/L — SIGNIFICANT CHANGE UP (ref 15–37)
BASOPHILS # BLD AUTO: 0.03 K/UL — SIGNIFICANT CHANGE UP (ref 0–0.2)
BASOPHILS NFR BLD AUTO: 0.2 % — SIGNIFICANT CHANGE UP (ref 0–2)
BILIRUB SERPL-MCNC: 0.8 MG/DL — SIGNIFICANT CHANGE UP (ref 0.2–1.2)
BUN SERPL-MCNC: 12 MG/DL — SIGNIFICANT CHANGE UP (ref 7–23)
BUN SERPL-MCNC: 14 MG/DL — SIGNIFICANT CHANGE UP (ref 7–23)
CALCIUM SERPL-MCNC: 8.5 MG/DL — SIGNIFICANT CHANGE UP (ref 8.4–10.5)
CALCIUM SERPL-MCNC: 9.3 MG/DL — SIGNIFICANT CHANGE UP (ref 8.5–10.5)
CHLORIDE SERPL-SCNC: 95 MMOL/L — LOW (ref 96–108)
CHLORIDE SERPL-SCNC: 98 MMOL/L — SIGNIFICANT CHANGE UP (ref 96–108)
CO2 SERPL-SCNC: 29 MMOL/L — SIGNIFICANT CHANGE UP (ref 22–31)
CO2 SERPL-SCNC: 31 MMOL/L — SIGNIFICANT CHANGE UP (ref 22–31)
CREAT SERPL-MCNC: 0.85 MG/DL — SIGNIFICANT CHANGE UP (ref 0.5–1.3)
CREAT SERPL-MCNC: 1.15 MG/DL — SIGNIFICANT CHANGE UP (ref 0.5–1.3)
EGFR: 105 ML/MIN/1.73M2 — SIGNIFICANT CHANGE UP
EGFR: 77 ML/MIN/1.73M2 — SIGNIFICANT CHANGE UP
EOSINOPHIL # BLD AUTO: 0 K/UL — SIGNIFICANT CHANGE UP (ref 0–0.5)
EOSINOPHIL NFR BLD AUTO: 0 % — SIGNIFICANT CHANGE UP (ref 0–6)
ETHANOL SERPL-MCNC: <10 MG/DL — SIGNIFICANT CHANGE UP (ref 0–10)
GLUCOSE SERPL-MCNC: 132 MG/DL — HIGH (ref 70–99)
GLUCOSE SERPL-MCNC: 78 MG/DL — SIGNIFICANT CHANGE UP (ref 70–99)
HCT VFR BLD CALC: 46.9 % — SIGNIFICANT CHANGE UP (ref 39–50)
HGB BLD-MCNC: 16.1 G/DL — SIGNIFICANT CHANGE UP (ref 13–17)
IMM GRANULOCYTES NFR BLD AUTO: 0.5 % — SIGNIFICANT CHANGE UP (ref 0–0.9)
LACTATE BLDV-MCNC: 1.1 MMOL/L — SIGNIFICANT CHANGE UP (ref 0.5–2)
LYMPHOCYTES # BLD AUTO: 1.1 K/UL — SIGNIFICANT CHANGE UP (ref 1–3.3)
LYMPHOCYTES # BLD AUTO: 7.4 % — LOW (ref 13–44)
MAGNESIUM SERPL-MCNC: 2 MG/DL — SIGNIFICANT CHANGE UP (ref 1.6–2.6)
MCHC RBC-ENTMCNC: 29.9 PG — SIGNIFICANT CHANGE UP (ref 27–34)
MCHC RBC-ENTMCNC: 34.3 GM/DL — SIGNIFICANT CHANGE UP (ref 32–36)
MCV RBC AUTO: 87.2 FL — SIGNIFICANT CHANGE UP (ref 80–100)
MONOCYTES # BLD AUTO: 1.34 K/UL — HIGH (ref 0–0.9)
MONOCYTES NFR BLD AUTO: 9 % — SIGNIFICANT CHANGE UP (ref 2–14)
NEUTROPHILS # BLD AUTO: 12.31 K/UL — HIGH (ref 1.8–7.4)
NEUTROPHILS NFR BLD AUTO: 82.9 % — HIGH (ref 43–77)
NRBC # BLD: 0 /100 WBCS — SIGNIFICANT CHANGE UP (ref 0–0)
PLATELET # BLD AUTO: 352 K/UL — SIGNIFICANT CHANGE UP (ref 150–400)
POTASSIUM SERPL-MCNC: 3.2 MMOL/L — LOW (ref 3.5–5.3)
POTASSIUM SERPL-MCNC: 3.2 MMOL/L — LOW (ref 3.5–5.3)
POTASSIUM SERPL-SCNC: 3.2 MMOL/L — LOW (ref 3.5–5.3)
POTASSIUM SERPL-SCNC: 3.2 MMOL/L — LOW (ref 3.5–5.3)
PROT SERPL-MCNC: 8.7 G/DL — HIGH (ref 6.4–8.2)
RBC # BLD: 5.38 M/UL — SIGNIFICANT CHANGE UP (ref 4.2–5.8)
RBC # FLD: 12.2 % — SIGNIFICANT CHANGE UP (ref 10.3–14.5)
SODIUM SERPL-SCNC: 135 MMOL/L — SIGNIFICANT CHANGE UP (ref 132–145)
SODIUM SERPL-SCNC: 136 MMOL/L — SIGNIFICANT CHANGE UP (ref 135–145)
WBC # BLD: 14.85 K/UL — HIGH (ref 3.8–10.5)
WBC # FLD AUTO: 14.85 K/UL — HIGH (ref 3.8–10.5)

## 2024-09-01 PROCEDURE — 99223 1ST HOSP IP/OBS HIGH 75: CPT

## 2024-09-01 PROCEDURE — 73701 CT LOWER EXTREMITY W/DYE: CPT | Mod: 26,RT,MC

## 2024-09-01 PROCEDURE — 99285 EMERGENCY DEPT VISIT HI MDM: CPT

## 2024-09-01 RX ORDER — POLYETHYLENE GLYCOL 3350 17 G/17G
17 POWDER, FOR SOLUTION ORAL DAILY
Refills: 0 | Status: DISCONTINUED | OUTPATIENT
Start: 2024-09-01 | End: 2024-09-12

## 2024-09-01 RX ORDER — ENOXAPARIN SODIUM 100 MG/ML
40 INJECTION SUBCUTANEOUS EVERY 24 HOURS
Refills: 0 | Status: DISCONTINUED | OUTPATIENT
Start: 2024-09-01 | End: 2024-09-12

## 2024-09-01 RX ORDER — KETOROLAC TROMETHAMINE 30 MG/ML
15 INJECTION, SOLUTION INTRAMUSCULAR ONCE
Refills: 0 | Status: DISCONTINUED | OUTPATIENT
Start: 2024-09-01 | End: 2024-09-01

## 2024-09-01 RX ORDER — SENNA 187 MG
2 TABLET ORAL AT BEDTIME
Refills: 0 | Status: DISCONTINUED | OUTPATIENT
Start: 2024-09-01 | End: 2024-09-12

## 2024-09-01 RX ORDER — KETOROLAC TROMETHAMINE 30 MG/ML
10 INJECTION, SOLUTION INTRAMUSCULAR EVERY 8 HOURS
Refills: 0 | Status: DISCONTINUED | OUTPATIENT
Start: 2024-09-01 | End: 2024-09-01

## 2024-09-01 RX ORDER — ACETAMINOPHEN 325 MG/1
650 TABLET ORAL EVERY 6 HOURS
Refills: 0 | Status: DISCONTINUED | OUTPATIENT
Start: 2024-09-01 | End: 2024-09-02

## 2024-09-01 RX ORDER — THIAMINE HCL 250 MG
100 TABLET ORAL DAILY
Refills: 0 | Status: DISCONTINUED | OUTPATIENT
Start: 2024-09-01 | End: 2024-09-02

## 2024-09-01 RX ORDER — NIFEDIPINE 60 MG/1
30 TABLET, FILM COATED, EXTENDED RELEASE ORAL DAILY
Refills: 0 | Status: DISCONTINUED | OUTPATIENT
Start: 2024-09-01 | End: 2024-09-12

## 2024-09-01 RX ORDER — METOPROLOL TARTRATE 100 MG/1
50 TABLET ORAL ONCE
Refills: 0 | Status: COMPLETED | OUTPATIENT
Start: 2024-09-01 | End: 2024-09-01

## 2024-09-01 RX ORDER — NALOXONE HCL 1 MG/ML
0.4 VIAL (ML) INJECTION ONCE
Refills: 0 | Status: DISCONTINUED | OUTPATIENT
Start: 2024-09-01 | End: 2024-09-12

## 2024-09-01 RX ORDER — ACETAMINOPHEN 325 MG/1
650 TABLET ORAL ONCE
Refills: 0 | Status: COMPLETED | OUTPATIENT
Start: 2024-09-01 | End: 2024-09-01

## 2024-09-01 RX ORDER — KETOROLAC TROMETHAMINE 30 MG/ML
15 INJECTION, SOLUTION INTRAMUSCULAR EVERY 6 HOURS
Refills: 0 | Status: DISCONTINUED | OUTPATIENT
Start: 2024-09-01 | End: 2024-09-02

## 2024-09-01 RX ORDER — LIDOCAINE/BENZALKONIUM/ALCOHOL
1 SOLUTION, NON-ORAL TOPICAL DAILY
Refills: 0 | Status: DISCONTINUED | OUTPATIENT
Start: 2024-09-01 | End: 2024-09-12

## 2024-09-01 RX ORDER — OXYCODONE HYDROCHLORIDE 5 MG/1
2.5 TABLET ORAL EVERY 4 HOURS
Refills: 0 | Status: DISCONTINUED | OUTPATIENT
Start: 2024-09-01 | End: 2024-09-02

## 2024-09-01 RX ORDER — VANCOMYCIN/0.9 % SOD CHLORIDE 1.75G/25
1000 PLASTIC BAG, INJECTION (ML) INTRAVENOUS EVERY 12 HOURS
Refills: 0 | Status: COMPLETED | OUTPATIENT
Start: 2024-09-01 | End: 2024-09-02

## 2024-09-01 RX ORDER — FOLIC ACID 1 MG
1 TABLET ORAL DAILY
Refills: 0 | Status: DISCONTINUED | OUTPATIENT
Start: 2024-09-01 | End: 2024-09-02

## 2024-09-01 RX ORDER — MAGNESIUM, ALUMINUM HYDROXIDE 200-225/5
30 SUSPENSION, ORAL (FINAL DOSE FORM) ORAL EVERY 4 HOURS
Refills: 0 | Status: DISCONTINUED | OUTPATIENT
Start: 2024-09-01 | End: 2024-09-12

## 2024-09-01 RX ORDER — ONDANSETRON 2 MG/ML
4 INJECTION, SOLUTION INTRAMUSCULAR; INTRAVENOUS EVERY 8 HOURS
Refills: 0 | Status: DISCONTINUED | OUTPATIENT
Start: 2024-09-01 | End: 2024-09-11

## 2024-09-01 RX ORDER — HYDRALAZINE HCL 50 MG
50 TABLET ORAL ONCE
Refills: 0 | Status: COMPLETED | OUTPATIENT
Start: 2024-09-01 | End: 2024-09-01

## 2024-09-01 RX ORDER — CLINDAMYCIN PHOSPHATE 150 MG/ML
600 VIAL (ML) INJECTION ONCE
Refills: 0 | Status: COMPLETED | OUTPATIENT
Start: 2024-09-01 | End: 2024-09-01

## 2024-09-01 RX ORDER — THIAMINE HCL 250 MG
100 TABLET ORAL DAILY
Refills: 0 | Status: DISCONTINUED | OUTPATIENT
Start: 2024-09-01 | End: 2024-09-01

## 2024-09-01 RX ORDER — THIAMINE HCL 250 MG
500 TABLET ORAL EVERY 8 HOURS
Refills: 0 | Status: DISCONTINUED | OUTPATIENT
Start: 2024-09-01 | End: 2024-09-01

## 2024-09-01 RX ORDER — POTASSIUM CHLORIDE 10 MEQ
40 TABLET, EXT RELEASE, PARTICLES/CRYSTALS ORAL ONCE
Refills: 0 | Status: COMPLETED | OUTPATIENT
Start: 2024-09-01 | End: 2024-09-01

## 2024-09-01 RX ORDER — AMLODIPINE BESYLATE 10 MG/1
5 TABLET ORAL DAILY
Refills: 0 | Status: DISCONTINUED | OUTPATIENT
Start: 2024-09-01 | End: 2024-09-01

## 2024-09-01 RX ADMIN — Medication 40 MILLIEQUIVALENT(S): at 06:24

## 2024-09-01 RX ADMIN — NIFEDIPINE 30 MILLIGRAM(S): 60 TABLET, FILM COATED, EXTENDED RELEASE ORAL at 20:54

## 2024-09-01 RX ADMIN — Medication 1 PATCH: at 21:01

## 2024-09-01 RX ADMIN — KETOROLAC TROMETHAMINE 15 MILLIGRAM(S): 30 INJECTION, SOLUTION INTRAMUSCULAR at 06:24

## 2024-09-01 RX ADMIN — ACETAMINOPHEN 650 MILLIGRAM(S): 325 TABLET ORAL at 13:16

## 2024-09-01 RX ADMIN — KETOROLAC TROMETHAMINE 15 MILLIGRAM(S): 30 INJECTION, SOLUTION INTRAMUSCULAR at 05:44

## 2024-09-01 RX ADMIN — Medication 50 MILLIGRAM(S): at 12:14

## 2024-09-01 RX ADMIN — ACETAMINOPHEN 650 MILLIGRAM(S): 325 TABLET ORAL at 21:55

## 2024-09-01 RX ADMIN — KETOROLAC TROMETHAMINE 15 MILLIGRAM(S): 30 INJECTION, SOLUTION INTRAMUSCULAR at 22:18

## 2024-09-01 RX ADMIN — Medication 100 MILLIGRAM(S): at 20:54

## 2024-09-01 RX ADMIN — Medication 100 MILLIGRAM(S): at 05:45

## 2024-09-01 RX ADMIN — METOPROLOL TARTRATE 50 MILLIGRAM(S): 100 TABLET ORAL at 08:53

## 2024-09-01 RX ADMIN — Medication 250 MILLIGRAM(S): at 20:55

## 2024-09-01 RX ADMIN — Medication 2 TABLET(S): at 21:57

## 2024-09-01 RX ADMIN — ACETAMINOPHEN 650 MILLIGRAM(S): 325 TABLET ORAL at 20:55

## 2024-09-01 RX ADMIN — KETOROLAC TROMETHAMINE 15 MILLIGRAM(S): 30 INJECTION, SOLUTION INTRAMUSCULAR at 21:18

## 2024-09-01 NOTE — ED PROVIDER NOTE - NSICDXFAMILYHX_GEN_ALL_CORE_FT
FAMILY HISTORY:  No pertinent family history in first degree relatives Pre-Excision Curettage Text (Leave Blank If You Do Not Want): Prior to drawing the surgical margin the visible lesion was removed with curettage to clearly define the lesion size.

## 2024-09-01 NOTE — ED PROVIDER NOTE - PHYSICAL EXAMINATION
General: No acute distress, mentation at baseline,  well nourished, well developed  HEENT: NCAT, Neck supple without meningismus, PERRL, no conjunctival injection  Lungs: CTAB, No wheeze or crackles, No retractions, No increased work of breathing  Heart: S1S2 RRR, No M/R/G, Pules equal Bilaterally in upper and lower extremities distally  Abd: soft, NT/ND, No guarding, No rebound.  No hernias, no palpable masses.  Extrem: FROM in all joints, no gross deformities appreciated, no significant edema noted, No ulcers. Cap refil < 2sec. (+) R foot swelling tenderness erythema, no purulent discharge  Skin: No rash noted, warm dry.  Neuro:  Grossly normal.  No difficulty ambulating. No focal deficits.  Psychiatric: Appropriate mood and affect.
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
## 2024-09-01 NOTE — H&P ADULT - PROBLEM SELECTOR PROBLEM 1
This is an 84 y/o M with PMH of HTN, HLD, Nonobstructive CAD, prostate cancer s/p RT, T2DM, Arthritis s/p Right total knee replacement in 2008, squamous cell carcinoma of scalp, BPH, ASHD, Chronic lower back pain secondary to Lumbar stenosis with neurogenic claudication, COVID 2020 who presented to [facility] on 1/24 with c/o lower back pain radiating down to B/L legs that has progressively worsened and has failed conservation treatments. MRI of lumbar spine revealed  Convex left curvature with straightening of the lordosis, multilevel loss of disc signal and height and anterior spurring and bulging from L2-L3 through L5-S1. He is s/p right posterior spine L3-L5 extreme lateral lumbar interbody fusion, L2-S1 posterior column osteotomy, facetectomy, foraminotomy, L3-L5 posterior instrumented fusion paraspinous muscle flap wound closure on 1/24. Patient now with gait Instability, ADL impairments and Functional impairments.    #Lumbar Radiculopathy  -MRI of lumbar spine revealed Convex left curvature with straightening of the lordosis, multilevel loss of disc signal and height and anterior spurring and bulging from L2-L3 through L5-S1  -Now s/p L3-L5 extreme lateral lumbar interbody fusion, L2-S1 posterior column osteotomy, facetectomy, foraminotomy, L3-L5 posterior instrumented fusion paraspinous muscle flap wound closure on 1/24  -Start comprehensive rehab program - PT/OT/SLP per rehab team  -Pain management, bowel regimen per rehab   -Hb 8.4, no active s/s bleeding, likely post op anemia, continue to monitor    #HLD  -Lipitor 80mg qd    #HTN  -Lisinopril 10mg qd  -Metoprolol 25mg BID    #DM II  -ISS and FS  -Lantus 9un qhs    #BPH  -Flomax 0.4mg qd    #DVT ppx - HSQ  #GI ppx - Pepcid    /w rehab team   Sepsis

## 2024-09-01 NOTE — H&P ADULT - NSICDXFAMHXNEG_GEN_ALL
asthma/atrial fibrillation/brain aneurysm/cancer/congestive heart failure/COPD/coronary disease/diabetes/kidney disease/stroke/thyroid disease/VTE

## 2024-09-01 NOTE — ED ADULT NURSE NOTE - NSFALLRISKINTERV_ED_ALL_ED
Assistance OOB with selected safe patient handling equipment if applicable/Assistance with ambulation/Communicate fall risk and risk factors to all staff, patient, and family/Monitor gait and stability/Provide visual cue: yellow wristband, yellow gown, etc/Reinforce activity limits and safety measures with patient and family/Call bell, personal items and telephone in reach/Instruct patient to call for assistance before getting out of bed/chair/stretcher/Non-slip footwear applied when patient is off stretcher/[place] to call system/Physically safe environment - no spills, clutter or unnecessary equipment/Purposeful Proactive Rounding/Room/bathroom lighting operational, light cord in reach

## 2024-09-01 NOTE — H&P ADULT - NSHPPHYSICALEXAM_GEN_ALL_CORE
.  VITAL SIGNS:  T(F): 98.6 (09-01-24 @ 17:09), Max: 99.5 (09-01-24 @ 13:03)  HR: 83 (09-01-24 @ 17:09) (83 - 111)  BP: 160/90 (09-01-24 @ 17:09) (134/82 - 191/113)  BP(mean): --  RR: 16 (09-01-24 @ 17:09) (16 - 18)  SpO2: 98% (09-01-24 @ 17:09) (95% - 98%)    PHYSICAL EXAM:    Constitutional: resting comfortably in bed; NAD  HEENT: NC/AT, PERRL, EOMI, anicteric sclera, no nasal discharge; uvula midline, no oropharyngeal erythema or exudates; MMM  Neck: supple; no JVD or thyromegaly  Respiratory: unlabored breathing, CTA B/L; no W/Rhonchi/Crackles, no retractions or use of accessory muscles   Cardiac: +S1/S2; RRR; no M/R/G; No ventricular heaves, PMI non-displaced  Gastrointestinal: soft, NT/ND; no rebound or guarding; +BSx4  Genitourinary: normal external genitalia  Back: spine midline, no bony tenderness or step-offs; no CVAT B/L  Extremities: WWP, no clubbing or cyanosis; no peripheral edema  Musculoskeletal: NROM x4; no joint swelling, tenderness or erythema  Vascular: 2+ radial, DP/PT pulses B/L  Dermatologic: skin warm, dry and intact; no rashes, wounds, or scars  Lymphatic: no submandibular or cervical LAD  Neurologic: AAOx3; CNII-XII grossly intact; no focal deficits  Psychiatric: affect and characteristics of appearance, verbalizations, behaviors are appropriate, denies SI/HI/AH/VH .  VITAL SIGNS:  T(F): 98.6 (09-01-24 @ 17:09), Max: 99.5 (09-01-24 @ 13:03)  HR: 83 (09-01-24 @ 17:09) (83 - 111)  BP: 160/90 (09-01-24 @ 17:09) (134/82 - 191/113)  BP(mean): --  RR: 16 (09-01-24 @ 17:09) (16 - 18)  SpO2: 98% (09-01-24 @ 17:09) (95% - 98%)    PHYSICAL EXAM:    Constitutional: resting comfortably in bed; NAD  HEENT: NC/AT, PERRL, EOMI, anicteric sclera, dry MM  Neck: supple; no JVD or thyromegaly  Respiratory: unlabored breathing, CTA B/L; no W/Rhonchi/Crackles,   Cardiac: +S1/S2; RRR; no M/R/G  Gastrointestinal: soft, NT/ND; no rebound or guarding; +BSx4  Extremities: WWP, no clubbing or cyanosis; no peripheral edema  Vascular: 2+ radial, DP/PT pulses B/L  Dermatologic: Rt foot- warm and tender to touch, edematous forefoot, with peeling of superficial epidermis, Lt foot NAD  Lymphatic: no submandibular or cervical LAD  Neurologic: AAOx3; no focal deficits .  VITAL SIGNS:  T(F): 98.6 (09-01-24 @ 17:09), Max: 99.5 (09-01-24 @ 13:03)  HR: 83 (09-01-24 @ 17:09) (83 - 111)  BP: 160/90 (09-01-24 @ 17:09) (134/82 - 191/113)  BP(mean): --  RR: 16 (09-01-24 @ 17:09) (16 - 18)  SpO2: 98% (09-01-24 @ 17:09) (95% - 98%)    PHYSICAL EXAM:    Constitutional: resting comfortably in bed; NAD  HEENT: NC/AT, PERRL, EOMI, anicteric sclera, dry MM  Neck: supple; no JVD or thyromegaly  Respiratory: unlabored breathing, CTA B/L; no W/Rhonchi/Crackles,   Cardiac: +S1/S2; RRR; no M/R/G  Gastrointestinal: soft, NT/ND; no rebound or guarding; +BSx4  Extremities: WWP, no clubbing or cyanosis; no peripheral edema  Vascular: 2+ radial, DP/PT pulses B/L  Dermatologic: Rt foot- warm and tender to touch, edematous forefoot w/fluctuance, with peeling of superficial epidermis, Lt foot NAD  Lymphatic: no submandibular or cervical LAD  Neurologic: AAOx3; no focal deficits .  VITAL SIGNS:  T(F): 98.6 (09-01-24 @ 17:09), Max: 99.5 (09-01-24 @ 13:03)  HR: 83 (09-01-24 @ 17:09) (83 - 111)  BP: 160/90 (09-01-24 @ 17:09) (134/82 - 191/113)  BP(mean): --  RR: 16 (09-01-24 @ 17:09) (16 - 18)  SpO2: 98% (09-01-24 @ 17:09) (95% - 98%)    PHYSICAL EXAM:    Constitutional: resting comfortably in bed; NAD  HEENT: NC/AT, PERRL, EOMI, anicteric sclera, dry MM  Neck: supple; no JVD or thyromegaly  Respiratory: unlabored breathing, CTA B/L; no W/Rhonchi/Crackles,   Cardiac: +S1/S2; RRR; no M/R/G  Gastrointestinal: soft, NT/ND; no rebound or guarding; +BSx4  Extremities: WWP, no clubbing or cyanosis; no peripheral edema  Vascular: 2+ radial, DP/PT pulses B/L  Dermatologic: Rt foot- warm and tender to touch, edematous forefoot w/fluctuance on the dorsum, with peeling of superficial epidermis, Lt foot NAD  Lymphatic: no submandibular or cervical LAD  Neurologic: AAOx3; no focal deficits
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
## 2024-09-01 NOTE — H&P ADULT - NSHPLABSRESULTS_GEN_ALL_CORE
.  LABS:                         16.1   14.85 )-----------( 352      ( 01 Sep 2024 05:40 )             46.9     09-01    135  |  95<L>  |  14  ----------------------------<  78  3.2<L>   |  31  |  1.15    Ca    9.3      01 Sep 2024 05:40    TPro  8.7<H>  /  Alb  3.3<L>  /  TBili  0.8  /  DBili  x   /  AST  25  /  ALT  25  /  AlkPhos  81  09-01      Urinalysis Basic - ( 01 Sep 2024 05:40 )    Color: x / Appearance: x / SG: x / pH: x  Gluc: 78 mg/dL / Ketone: x  / Bili: x / Urobili: x   Blood: x / Protein: x / Nitrite: x   Leuk Esterase: x / RBC: x / WBC x   Sq Epi: x / Non Sq Epi: x / Bacteria: x                RADIOLOGY, EKG & ADDITIONAL TESTS: Reviewed.

## 2024-09-01 NOTE — ED PROVIDER NOTE - NS ED ROS FT
CONST: (+) fevers, (+) chills  EYES: no pain, no vision changes  ENT: no sore throat, no ear pain, no change in hearing  CV: no chest pain, no leg swelling  RESP: no shortness of breath, no cough  ABD: no abdominal pain, no nausea, no vomiting, no diarrhea  : no dysuria, no flank pain, no hematuria  MSK: no back pain, (+) extremity pain  NEURO: no headache or additional neurologic complaints  HEME: no easy bleeding  SKIN:  no rash

## 2024-09-01 NOTE — ED ADULT TRIAGE NOTE - TEMP AT ED ARRIVAL (C)
[Normal] : no posterior cervical lymphadenopathy and no anterior cervical lymphadenopathy [de-identified] : Cobblestone mucosa [de-identified] : tenderness over right achilles, dorsiflexion and plantar flexion 5/5  [de-identified] : erythematous patch on leg  37.4

## 2024-09-01 NOTE — ED PROVIDER NOTE - PROGRESS NOTE DETAILS
Case discussed with attending hospitalist at [facility], Dr. Capps.  We discussed the case and she accepted patient to a regional medicine bed. I noticed there was an over read on this patient's CT scan.  Unlike the preliminary read, the attending read shows an abscess, ~4x4.5x5.5cm.  I contacted ortho attending on call, Dr. Flores.  He reports that this should be addressed by the podiatry team.      I called the [facility] and they do not have a podiatrist listed as being on call at the moment.  They reached out to Dr. Capps (hospitalist) but had to leave VM.  Left information in the chat.  Previously, I have admitted cases that require podiatry to medicine so we will keep current plan.

## 2024-09-01 NOTE — H&P ADULT - PROBLEM SELECTOR PLAN 2
Presentation: R foot pain 2/2 cellulitis  CT Foot:  Ill-defined moderate volume abscess involving right second and third distal intermetatarsal bursae and fourth MTP joint in the proper clinical setting. No tracking emphysema.  - plan as above Presentation: R foot pain 2/2 cellulitis  CT Foot:  Ill-defined moderate volume abscess involving right second and third distal intermetatarsal bursae and fourth MTP joint in the proper clinical setting. No tracking emphysema.  - plan as above  - Pain management: Tylenol 650mg for mild-moderate pain, oxy 2.5 for severe pain Presentation: R foot pain 2/2 cellulitis  CT Foot:  Ill-defined moderate volume abscess involving right second and third distal intermetatarsal bursae and fourth MTP joint in the proper clinical setting. No tracking emphysema.  - plan as above  - Pain management: Tylenol 650mg for mild, toradol for moderate pain, oxy 2.5 for severe pain Presentation: R foot pain 2/2 cellulitis  CT Foot:  Ill-defined moderate volume abscess involving right second and third distal intermetatarsal bursae and fourth MTP joint in the proper clinical setting. No tracking emphysema.  - plan as above  - Pain management: Tylenol 650mg for mild, toradol 10mg Q8H PRN for moderate pain, oxy 2.5 q4h PRN for severe pain Presentation: R foot pain 2/2 cellulitis  CT Foot:  Ill-defined moderate volume abscess involving right second and third distal intermetatarsal bursae and fourth MTP joint in the proper clinical setting. No tracking emphysema.  - plan as above  - Pain management: Tylenol 650mg for mild, toradol 15mg Q6H IV PRN for moderate pain, oxy 2.5 q4h PRN for severe pain

## 2024-09-01 NOTE — H&P ADULT - PROBLEM SELECTOR PLAN 3
Patient denies prior admisson for EtOh abuse, seizures or h/o intubations,   Last drink 2 days ago, Drank 1/3 bottle of tequila   CIWA- 1 (at time of assessment)  - f/u blood alcohol  - CIWA checks  - consider Librium 50mg q8h  - If CIWA>8, Ativan 2mg PRN every 15 mins  - Seizure precautions, Aspiration precautions  - Thiamine 500mg IV q8h for 3 days.   - Folic acid 1mg IVP qd.  - SBIRT   - Regular diet  - Monitor electrolytes Patient denies prior admisson for EtOh abuse, seizures or h/o intubations,   Last drink 2 days ago, Drank 1/3 bottle of tequila   CIWA- 1 (at time of assessment)  - f/u blood alcohol  - CIWA checks  - consider Librium 50mg q8h  - If CIWA>8, Ativan 2mg PRN every 15 mins  - Seizure precautions, Aspiration precautions  - Thiamine 100mg oral qd for 3 days.   - Folic acid 1mg IVP qd.  - SBIRT   - Regular diet  - Monitor electrolytes Patient denies prior admisson for EtOh abuse, seizures or h/o intubations,   Last drink 2 days ago, Drank 1/3 bottle of tequila   CIWA- 1 (at time of assessment)  - f/u blood alcohol  - CIWA checks  - consider Librium 50mg q8h  - If CIWA>8, Ativan 2mg PRN every 15 mins  - Thiamine 100mg oral qd for 3 days.   - Folic acid 1mg IVP qd.  - SBIRT   - Regular diet  - Monitor electrolytes Patient denies prior admisson for EtOh abuse, seizures or h/o intubations,   Last drink 2 days ago, Drank 1/3 bottle of tequila   CIWA- 1 (at time of assessment)  - f/u blood alcohol  - CIWA checks  - consider Librium 50mg q8h  - If CIWA>8, Ativan 1mg PRN every 15 mins  - Thiamine 500mg IV q8h for 3 days.   - Folic acid 1mg IVP qd.  - SBIRT   - Regular diet  - Monitor electrolytes Patient denies prior admisson for EtOh abuse, seizures or h/o intubations,   Last drink 2 days ago, Drank 1/3 bottle of tequila   CIWA- 1 (at time of assessment)  - f/u blood alcohol  - CIWA checks  - If CIWA>8, Ativan 1mg PRN every 15 mins  - Thiamine 100mg PO for 3 days.   - Folic acid 1mg IVP qd.  - MVI  - Regular diet  - Monitor electrolytes, replete PRN

## 2024-09-01 NOTE — ED ADULT TRIAGE NOTE - HEART RATE (BEATS/MIN)
Medication options: vyvanse, concerta    BP - may add in hydrochlorothiazide    Patient Education     Established High Blood Pressure    High blood pressure (hypertension) is a chronic disease. Often, healthcare providers don’t know what causes it. But it can be caused by certain health conditions and medicines.  If you have high blood pressure, you may not have any symptoms. If you do have symptoms, they may include headache, dizziness, changes in your vision, chest pain, and shortness of breath. But even without symptoms, high blood pressure that’s not treated raises your risk for heart attack, heart failure, and stroke. High blood pressure is a serious health risk and shouldn’t be ignored.  Blood pressure measurements are given as 2 numbers. Systolic blood pressure is the upper number. This is the pressure when the heart contracts. Diastolic blood pressure is the lower number. This is the pressure when the heart relaxes between beats. You will see your blood pressure readings written together. For example, a person with a systolic pressure of 118 and a diastolic pressure of 78 will have 118/78 written in the medical record.  Blood pressure is categorized as normal, elevated, or stage 1 or stage 2 high blood pressure:  · Normal blood pressure is systolic of less than 120 and diastolic of less than 80 (120/80)  · Elevated blood pressure is systolic of 120 to 129 and diastolic less than 80  · Stage 1 high blood pressure is systolic is 130 to 139 or diastolic between 80 to 89  · Stage 2 high blood pressure is when systolic is 140 or higher or the diastolic is 90 or higher  Home care  If you have high blood pressure, follow these home care guidelines to help lower your blood pressure. If you are taking medicines for high blood pressure, these methods may reduce or end your need for medicines in the future.  · Start a weight-loss program if you are overweight.  · Cut back on how much salt you get in your diet. Here’s  how to do this:  ? Don’t eat foods that have a lot of salt. These include olives, pickles, smoked meats, and salted potato chips.  ? Don’t add salt to your food at the table.  ? Use only small amounts of salt when cooking.  · Start an exercise program. Talk with your healthcare provider about the type of exercise program that would be best for you. It doesn't have to be hard. Even brisk walking for 20 minutes 3 times a week is a good form of exercise.  · Don’t take medicines that stimulate the heart. This includes many over-the-counter cold and sinus decongestant pills and sprays, as well as diet pills. Check the warnings about high blood pressure on the label. Before buying any over-the-counter medicines or supplements, always ask the pharmacist about the product's potential interaction with your high blood pressure and your high blood pressure medicines.  · Stimulants such as amphetamine or cocaine could be deadly for someone with high blood pressure. Never take these.  · Limit how much caffeine you get in your diet. Switch to caffeine-free products.  · Stop smoking. If you are a long-time smoker, this can be hard. Talk to your healthcare provider about medicines and nicotine replacement options to help you. Also, enroll in a stop-smoking program to make it more likely that you will quit for good.  · Learn how to handle stress. This is an important part of any program to lower blood pressure. Learn about relaxation methods like meditation, yoga, or biofeedback.  · If your provider prescribed medicines, take them exactly as directed. Missing doses may cause your blood pressure get out of control.  · If you miss a dose or doses, check with your healthcare provider or pharmacist about what to do.  · Consider buying an automatic blood pressure machine to check your blood pressure at home. Ask your provider for a recommendation. You can get one of these at most pharmacies.     The American Heart Association recommends  the following guidelines for home blood pressure monitoring:  · Don't smoke or drink coffee for 30 minutes before taking your blood pressure.  · Go to the bathroom before the test.  · Relax for 5 minutes before taking the measurement.  · Sit with your back supported (don't sit on a couch or soft chair); keep your feet on the floor uncrossed. Place your arm on a solid flat surface (like a table) with the upper part of the arm at heart level. Place the middle of the cuff directly above the bend of the elbow. Check the monitor's instruction manual for an illustration.  · Take multiple readings. When you measure, take 2 to 3 readings one minute apart and record all of the results.  · Take your blood pressure at the same time every day, or as your healthcare provider recommends.  · Record the date, time, and blood pressure reading.  · Take the record with you to your next medical appointment. If your blood pressure monitor has a built-in memory, simply take the monitor with you to your next appointment.  · Call your provider if you have several high readings. Don't be frightened by a single high blood pressure reading, but if you get several high readings, check in with your healthcare provider.  · Note: When blood pressure reaches a systolic (top number) of 180 or higher OR diastolic (bottom number) of 110 or higher, seek emergency medical treatment.  Follow-up care  You will need to see your healthcare provider regularly. This is to check your blood pressure and to make changes to your medicines. Make a follow-up appointment as directed. Bring the record of your home blood pressure readings to the appointment.  When to seek medical advice  Call your healthcare provider right away if any of these occur:  · Blood pressure reaches a systolic (upper number) of 180 or higher OR a diastolic (bottom number) of 110 or higher  · Chest pain or shortness of breath  · Severe headache  · Throbbing or rushing sound in the  ears  · Nosebleed  · Sudden severe pain in your belly (abdomen)  · Extreme drowsiness, confusion, or fainting  · Dizziness or spinning sensation (vertigo)  · Weakness of an arm or leg or one side of the face  · You have problems speaking or seeing   Date Last Reviewed: 12/1/2016  © 1436-2845 [employer]. [address] [place], [place] [id number]. All rights reserved. This information is not intended as a substitute for professional medical care. Always follow your healthcare professional's instructions.            111

## 2024-09-01 NOTE — PATIENT PROFILE ADULT - FALL HARM RISK - HARM RISK INTERVENTIONS
Assistance with ambulation/Assistance OOB with selected safe patient handling equipment/Communicate Risk of Fall with Harm to all staff/Discuss with provider need for PT consult/Monitor gait and stability/Reinforce activity limits and safety measures with patient and family/Tailored Fall Risk Interventions/Visual Cue: Yellow wristband and red socks/Bed in lowest position, wheels locked, appropriate side rails in place/Call bell, personal items and telephone in reach/Instruct patient to call for assistance before getting out of bed or chair/Non-slip footwear when patient is out of bed/[place] to call system/Physically safe environment - no spills, clutter or unnecessary equipment/Purposeful Proactive Rounding/Room/bathroom lighting operational, light cord in reach

## 2024-09-01 NOTE — ED ADULT NURSE REASSESSMENT NOTE - NS ED NURSE REASSESS COMMENT FT1
Report attempted to be called to 7U  RN spoke to Blu RN who said the room is not clean. RN asked what time the expected cleaning would be and RN said she has no idea  Accepting RN is Grover RN  Pt in bed, in nad, Pt AOX4, speaking in full clear sentences, breathing equal and unlabored    Plan of care ongoing

## 2024-09-01 NOTE — H&P ADULT - PROBLEM SELECTOR PLAN 4
- Not on any home meds, p/w /104 s/p lopressor 50x1,  Hydral 50x1 in ED  EKG: NSR, HR-96, QTc-454  Plan:  - Get Baseline TTE  - Start Nifedipine XL 30mg qd - Not on any home meds, p/w /104 s/p lopressor 50x1,  Hydral 50x1 in ED  EKG: NSR, HR-96, QTc-454  Plan:  - Get Baseline TTE since pt has uncontrolled HTN and not on meds, concern for borderline LVH on EKG (still not meeting criteria for LVH R wave V5+S wave V1<35MM)  - Start amlodipine 5mg qd (h/o cocaine abuse, avoid BB) - Not on any home meds, p/w /104 s/p lopressor 50x1,  Hydral 50x1 in ED  EKG: NSR, HR-96, QTc-454  Plan:  - Get Baseline TTE since pt has uncontrolled HTN and not on meds, concern for borderline LVH on EKG (still not meeting criteria for LVH R wave V5+S wave V1<35MM)  - Start nifedipine xl 30mg qd (h/o cocaine abuse, avoid BB)

## 2024-09-01 NOTE — ED ADULT NURSE REASSESSMENT NOTE - NS ED NURSE REASSESS COMMENT FT1
Pt being transported via ems. All care complete and endorsed to ems. See flowchart for vs. All ppwk and belonigns going with patient and ems. Pt in no current distress and has no new complaints. Care Complete, transfered over to EMS

## 2024-09-01 NOTE — H&P ADULT - PROBLEM SELECTOR PLAN 1
Presentation: Pt meets criteria for sepsis (SIRS 2/4 (WBC>12, HR>90), Source: R foor cellulitis) s/p clindamycin in the ED. Recently d/c from [facility] on Cephalexin  Currently WWP, mentating well, and making urine.    -start Vancomycin and Zosyn for broad spectrum coverage  - f/u blood cultures  - f/u urine cx  - continue with IVF Presentation: Pt meets criteria for sepsis (SIRS 2/4 (WBC>12, HR>90), Source: R foor cellulitis) s/p clindamycin in the ED. Recently d/c from [facility] on Cephalexin  Currently WWP, mentating well, and making urine.    9/1: CT Foot:  Ill-defined moderate volume abscess involving right second and third distal intermetatarsal bursae and fourth MTP joint in the proper clinical setting. No tracking emphysema.  -start Vancomycin and Zosyn for broad spectrum coverage  - f/u blood cultures  - f/u urine cx  - consult podiatry  - Get SW on board Presentation: Pt meets criteria for sepsis (SIRS 2/4 (WBC>12, HR>90), Source: R foor cellulitis) s/p clindamycin in the ED. Recently d/c from [facility] on Cephalexin  Currently WWP, mentating well, and making urine.    9/1: CT Foot:  Ill-defined moderate volume abscess involving right second and third distal intermetatarsal bursae and fourth MTP joint in the proper clinical setting. No tracking emphysema.  -start Vancomycin for broad spectrum coverage  - f/u blood cultures  - f/u urine cx  - consult podiatry  - Get SW on board  - send MRSA nares  - Vancomycin Presentation: Pt meets criteria for sepsis (SIRS 2/4 (WBC>12, HR>90), Source: R foor cellulitis) s/p clindamycin in the ED. Recently d/c from [facility] on Cephalexin  Currently WWP, mentating well, and making urine.    9/1: CT Foot:  Ill-defined moderate volume abscess involving right second and third distal intermetatarsal bursae and fourth MTP joint in the proper clinical setting. No tracking emphysema.  -start Vancomycin 1g q12h  - check vanc trough before 4th dose  - f/u blood cultures  - f/u urine cx  - consult podiatry  - Get SW on board  - send MRSA nares  - Vancomycin Presentation: Pt meets criteria for sepsis (SIRS 2/4 (WBC>12, HR>90), Source: R foor cellulitis) s/p clindamycin in the ED. Recently d/c from [facility] on Cephalexin  Currently WWP, mentating well, and making urine.    9/1: CT Foot:  Ill-defined moderate volume abscess involving right second and third distal intermetatarsal bursae and fourth MTP joint in the proper clinical setting. No tracking emphysema.  -start Vancomycin 1g q12h  - check vanc trough before 4th dose  - f/u blood cultures  - f/u urine cx  - consult podiatry  - Get SW on board  - send MRSA nares Presentation: Pt meets criteria for sepsis (SIRS 2/4 (WBC>12, HR>90), Source: R foor cellulitis) s/p clindamycin in the ED. Recently d/c from [facility] on Cephalexin  Currently WWP, mentating well, and making urine.    9/1: CT Foot:  Ill-defined moderate volume abscess involving right second and third distal intermetatarsal bursae and fourth MTP joint in the proper clinical setting. No tracking emphysema.  -start Vancomycin 1g q12h  - check vanc trough before 4th dose  - f/u blood cultures  - f/u urine cx  - consult podiatry  - Get SW on board Presentation: Pt meets criteria for sepsis (SIRS 2/4 (WBC>12, HR>90), Source: R foot cellulitis) s/p clindamycin in the ED. Recently d/c from [facility] on Cephalexin 500mg  Currently WWP, mentating well, and making urine.    9/1: CT Foot:  Ill-defined moderate volume abscess involving right second and third distal intermetatarsal bursae and fourth MTP joint in the proper clinical setting. No tracking emphysema.  -start Vancomycin 1g q12h (15mg/kg)  - check vanc trough before 4th dose  - f/u blood cultures  - f/u urine cx  - consulted podiatry  - Get SW on board  - f/u X ray foot Presentation: Pt meets criteria for sepsis (SIRS 2/4 (WBC>12, HR>90), Source: R foot cellulitis) s/p clindamycin in the ED. Recently d/c from [facility] on Cephalexin 500mg  Currently WWP, mentating well, and making urine.    9/1: CT Foot:  Ill-defined moderate volume abscess involving right second and third distal intermetatarsal bursae and fourth MTP joint in the proper clinical setting. No tracking emphysema.  -start Vancomycin 1g q12h (15mg/kg)  - check vanc trough before 4th dose  - f/u blood cultures  - f/u urine cx  - consulted podiatry  - Get SW on board  - f/u X ray foot  -f/u ESR, CRP

## 2024-09-01 NOTE — H&P ADULT - NSHPSOCIALHISTORY_GEN_ALL_CORE
undomiciled,  He is sexually active with different partners, doesnt use protection, endorses recent HIV test being negative, however no report available.

## 2024-09-01 NOTE — ED ADULT NURSE REASSESSMENT NOTE - NS ED NURSE REASSESS COMMENT FT1
Received report from from Latanya RN for continuity of care  Pt in bed, Pt AOX4, speaking in full clear sentences, breathing equal and unlabored  Pt with no current complaints, in good spirits  Neg pain  Food/snacks offered and given to pt  Plan of care ongoing

## 2024-09-01 NOTE — ED ADULT TRIAGE NOTE - CHIEF COMPLAINT QUOTE
pt. presents with redness and swelling of the right foot. Pt. endorses he was prescribed antibiotics four days ago but was unable to afford the prescription. Pt. also reports he has not had his BP meds in about 2 years.

## 2024-09-01 NOTE — ED PROVIDER NOTE - CLINICAL SUMMARY MEDICAL DECISION MAKING FREE TEXT BOX
48 y/o M w/ pmh of HTN p/w R foot painx  4 days. states he believes he had bugs bite him on R foot, pt is undomiicled. states he noticed redness warmth and subjective fever chills. went to [facility], diagnosed with cellulitis, discharged on oral abx. however states he has no insurance and no ability to  abx. states over last 4 days pain swelling have progressively worsened, difficulty ambulating. on exam tachy, low grade fever, exam findings above. no signs of nec fasc, no open wounds. clinical presentation likely 2/2 foot cellulitis, will r/o abscess with CT. will likely require admission to medicine given severity of cellulitis and social situation and progression of cellulitis
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
## 2024-09-01 NOTE — H&P ADULT - PROBLEM SELECTOR PLAN 5
Smokes cocaine and crack, last smoked 1 month ago  -f/u Utox  - f/u HIV screen (high risk factors like unprotected sex w/ mult partners)  - consider STI screening outpatient Smokes cocaine and crack, last smoked 1 month ago  -f/u Utox, blood drug screen  - f/u HIV screen (high risk factors like unprotected sex w/ mult partners)  - consider STI screening outpatient

## 2024-09-02 DIAGNOSIS — A41.9 SEPSIS, UNSPECIFIED ORGANISM: ICD-10-CM

## 2024-09-02 DIAGNOSIS — L02.91 CUTANEOUS ABSCESS, UNSPECIFIED: ICD-10-CM

## 2024-09-02 DIAGNOSIS — L02.611 CUTANEOUS ABSCESS OF RIGHT FOOT: ICD-10-CM

## 2024-09-02 LAB
A1C WITH ESTIMATED AVERAGE GLUCOSE RESULT: 6 % — HIGH (ref 4–5.6)
ALBUMIN SERPL ELPH-MCNC: 3.5 G/DL — SIGNIFICANT CHANGE UP (ref 3.3–5)
ALP SERPL-CCNC: 68 U/L — SIGNIFICANT CHANGE UP (ref 40–120)
ALT FLD-CCNC: 15 U/L — SIGNIFICANT CHANGE UP (ref 10–45)
AMPHET UR-MCNC: NEGATIVE — SIGNIFICANT CHANGE UP
ANION GAP SERPL CALC-SCNC: 8 MMOL/L — SIGNIFICANT CHANGE UP (ref 5–17)
APPEARANCE UR: CLEAR — SIGNIFICANT CHANGE UP
AST SERPL-CCNC: 19 U/L — SIGNIFICANT CHANGE UP (ref 10–40)
BACTERIA # UR AUTO: NEGATIVE /HPF — SIGNIFICANT CHANGE UP
BARBITURATES UR SCN-MCNC: NEGATIVE — SIGNIFICANT CHANGE UP
BASOPHILS # BLD AUTO: 0.05 K/UL — SIGNIFICANT CHANGE UP (ref 0–0.2)
BASOPHILS NFR BLD AUTO: 0.3 % — SIGNIFICANT CHANGE UP (ref 0–2)
BENZODIAZ UR-MCNC: NEGATIVE — SIGNIFICANT CHANGE UP
BILIRUB SERPL-MCNC: 0.3 MG/DL — SIGNIFICANT CHANGE UP (ref 0.2–1.2)
BILIRUB UR-MCNC: NEGATIVE — SIGNIFICANT CHANGE UP
BLD GP AB SCN SERPL QL: NEGATIVE — SIGNIFICANT CHANGE UP
BUN SERPL-MCNC: 11 MG/DL — SIGNIFICANT CHANGE UP (ref 7–23)
CALCIUM SERPL-MCNC: 9 MG/DL — SIGNIFICANT CHANGE UP (ref 8.4–10.5)
CAST: 1 /LPF — SIGNIFICANT CHANGE UP (ref 0–4)
CHLORIDE SERPL-SCNC: 100 MMOL/L — SIGNIFICANT CHANGE UP (ref 96–108)
CO2 SERPL-SCNC: 30 MMOL/L — SIGNIFICANT CHANGE UP (ref 22–31)
COCAINE METAB.OTHER UR-MCNC: POSITIVE
COLOR SPEC: SIGNIFICANT CHANGE UP
CREAT SERPL-MCNC: 0.92 MG/DL — SIGNIFICANT CHANGE UP (ref 0.5–1.3)
CRP SERPL-MCNC: 246.5 MG/L — HIGH (ref 0–4)
DIFF PNL FLD: NEGATIVE — SIGNIFICANT CHANGE UP
EGFR: 101 ML/MIN/1.73M2 — SIGNIFICANT CHANGE UP
EOSINOPHIL # BLD AUTO: 0.11 K/UL — SIGNIFICANT CHANGE UP (ref 0–0.5)
EOSINOPHIL NFR BLD AUTO: 0.8 % — SIGNIFICANT CHANGE UP (ref 0–6)
ERYTHROCYTE [SEDIMENTATION RATE] IN BLOOD: 68 MM/HR — HIGH
ESTIMATED AVERAGE GLUCOSE: 126 MG/DL — HIGH (ref 68–114)
FENTANYL UR QL SCN: NEGATIVE — SIGNIFICANT CHANGE UP
GLUCOSE SERPL-MCNC: 117 MG/DL — HIGH (ref 70–99)
GLUCOSE UR QL: 100 MG/DL
HCT VFR BLD CALC: 45.3 % — SIGNIFICANT CHANGE UP (ref 39–50)
HGB BLD-MCNC: 15.2 G/DL — SIGNIFICANT CHANGE UP (ref 13–17)
HIV 1+2 AB+HIV1 P24 AG SERPL QL IA: SIGNIFICANT CHANGE UP
IMM GRANULOCYTES NFR BLD AUTO: 0.4 % — SIGNIFICANT CHANGE UP (ref 0–0.9)
KETONES UR-MCNC: ABNORMAL MG/DL
LEUKOCYTE ESTERASE UR-ACNC: ABNORMAL
LYMPHOCYTES # BLD AUTO: 1.33 K/UL — SIGNIFICANT CHANGE UP (ref 1–3.3)
LYMPHOCYTES # BLD AUTO: 9.2 % — LOW (ref 13–44)
MAGNESIUM SERPL-MCNC: 2.3 MG/DL — SIGNIFICANT CHANGE UP (ref 1.6–2.6)
MCHC RBC-ENTMCNC: 29.3 PG — SIGNIFICANT CHANGE UP (ref 27–34)
MCHC RBC-ENTMCNC: 33.6 GM/DL — SIGNIFICANT CHANGE UP (ref 32–36)
MCV RBC AUTO: 87.5 FL — SIGNIFICANT CHANGE UP (ref 80–100)
METHADONE UR-MCNC: NEGATIVE — SIGNIFICANT CHANGE UP
MONOCYTES # BLD AUTO: 1.31 K/UL — HIGH (ref 0–0.9)
MONOCYTES NFR BLD AUTO: 9 % — SIGNIFICANT CHANGE UP (ref 2–14)
NEUTROPHILS # BLD AUTO: 11.66 K/UL — HIGH (ref 1.8–7.4)
NEUTROPHILS NFR BLD AUTO: 80.3 % — HIGH (ref 43–77)
NITRITE UR-MCNC: NEGATIVE — SIGNIFICANT CHANGE UP
NRBC # BLD: 0 /100 WBCS — SIGNIFICANT CHANGE UP (ref 0–0)
OPIATES UR-MCNC: NEGATIVE — SIGNIFICANT CHANGE UP
PCP SPEC-MCNC: SIGNIFICANT CHANGE UP
PCP UR-MCNC: NEGATIVE — SIGNIFICANT CHANGE UP
PH UR: 7.5 — SIGNIFICANT CHANGE UP (ref 5–8)
PHOSPHATE SERPL-MCNC: 3.4 MG/DL — SIGNIFICANT CHANGE UP (ref 2.5–4.5)
PLATELET # BLD AUTO: 363 K/UL — SIGNIFICANT CHANGE UP (ref 150–400)
POTASSIUM SERPL-MCNC: 3.8 MMOL/L — SIGNIFICANT CHANGE UP (ref 3.5–5.3)
POTASSIUM SERPL-SCNC: 3.8 MMOL/L — SIGNIFICANT CHANGE UP (ref 3.5–5.3)
PROT SERPL-MCNC: 7.3 G/DL — SIGNIFICANT CHANGE UP (ref 6–8.3)
PROT UR-MCNC: SIGNIFICANT CHANGE UP MG/DL
RBC # BLD: 5.18 M/UL — SIGNIFICANT CHANGE UP (ref 4.2–5.8)
RBC # FLD: 12.4 % — SIGNIFICANT CHANGE UP (ref 10.3–14.5)
RBC CASTS # UR COMP ASSIST: 4 /HPF — SIGNIFICANT CHANGE UP (ref 0–4)
RH IG SCN BLD-IMP: POSITIVE — SIGNIFICANT CHANGE UP
SODIUM SERPL-SCNC: 138 MMOL/L — SIGNIFICANT CHANGE UP (ref 135–145)
SP GR SPEC: 1.03 — SIGNIFICANT CHANGE UP (ref 1–1.03)
SQUAMOUS # UR AUTO: 2 /HPF — SIGNIFICANT CHANGE UP (ref 0–5)
THC UR QL: POSITIVE
UROBILINOGEN FLD QL: >=8 MG/DL (ref 0.2–1)
WBC # BLD: 14.52 K/UL — HIGH (ref 3.8–10.5)
WBC # FLD AUTO: 14.52 K/UL — HIGH (ref 3.8–10.5)
WBC UR QL: 3 /HPF — SIGNIFICANT CHANGE UP (ref 0–5)

## 2024-09-02 PROCEDURE — 73720 MRI LWR EXTREMITY W/O&W/DYE: CPT | Mod: 26,RT

## 2024-09-02 PROCEDURE — 73620 X-RAY EXAM OF FOOT: CPT | Mod: 26,RT

## 2024-09-02 PROCEDURE — 99233 SBSQ HOSP IP/OBS HIGH 50: CPT | Mod: GC

## 2024-09-02 RX ORDER — HYDROMORPHONE HYDROCHLORIDE 2 MG/1
0.5 TABLET ORAL ONCE
Refills: 0 | Status: DISCONTINUED | OUTPATIENT
Start: 2024-09-02 | End: 2024-09-03

## 2024-09-02 RX ORDER — POTASSIUM CHLORIDE 10 MEQ
20 TABLET, EXT RELEASE, PARTICLES/CRYSTALS ORAL
Refills: 0 | Status: COMPLETED | OUTPATIENT
Start: 2024-09-02 | End: 2024-09-02

## 2024-09-02 RX ORDER — IBUPROFEN 600 MG
400 TABLET ORAL EVERY 6 HOURS
Refills: 0 | Status: DISCONTINUED | OUTPATIENT
Start: 2024-09-02 | End: 2024-09-11

## 2024-09-02 RX ORDER — LIDOCAINE HCL 20 MG/ML
20 VIAL (ML) INJECTION ONCE
Refills: 0 | Status: COMPLETED | OUTPATIENT
Start: 2024-09-02 | End: 2024-09-02

## 2024-09-02 RX ORDER — AMPICILLIN SODIUM AND SULBACTAM SODIUM 1; .5 G/1; G/1
3 INJECTION, POWDER, FOR SOLUTION INTRAMUSCULAR; INTRAVENOUS EVERY 6 HOURS
Refills: 0 | Status: DISCONTINUED | OUTPATIENT
Start: 2024-09-02 | End: 2024-09-04

## 2024-09-02 RX ORDER — ACETAMINOPHEN 325 MG/1
650 TABLET ORAL EVERY 6 HOURS
Refills: 0 | Status: DISCONTINUED | OUTPATIENT
Start: 2024-09-02 | End: 2024-09-12

## 2024-09-02 RX ADMIN — Medication 20 MILLIEQUIVALENT(S): at 04:10

## 2024-09-02 RX ADMIN — Medication 1 PATCH: at 11:40

## 2024-09-02 RX ADMIN — Medication 20 MILLIEQUIVALENT(S): at 06:28

## 2024-09-02 RX ADMIN — Medication 100 MILLIGRAM(S): at 11:39

## 2024-09-02 RX ADMIN — Medication 250 MILLIGRAM(S): at 21:47

## 2024-09-02 RX ADMIN — Medication 20 MILLIEQUIVALENT(S): at 02:05

## 2024-09-02 RX ADMIN — POLYETHYLENE GLYCOL 3350 17 GRAM(S): 17 POWDER, FOR SOLUTION ORAL at 11:44

## 2024-09-02 RX ADMIN — Medication 1 MILLIGRAM(S): at 11:39

## 2024-09-02 RX ADMIN — Medication 400 MILLIGRAM(S): at 22:47

## 2024-09-02 RX ADMIN — AMPICILLIN SODIUM AND SULBACTAM SODIUM 200 GRAM(S): 1; .5 INJECTION, POWDER, FOR SOLUTION INTRAMUSCULAR; INTRAVENOUS at 17:50

## 2024-09-02 RX ADMIN — Medication 20 MILLILITER(S): at 12:24

## 2024-09-02 RX ADMIN — Medication 400 MILLIGRAM(S): at 21:47

## 2024-09-02 RX ADMIN — ACETAMINOPHEN 650 MILLIGRAM(S): 325 TABLET ORAL at 23:05

## 2024-09-02 RX ADMIN — ACETAMINOPHEN 650 MILLIGRAM(S): 325 TABLET ORAL at 14:16

## 2024-09-02 RX ADMIN — Medication 1 PATCH: at 10:06

## 2024-09-02 RX ADMIN — ACETAMINOPHEN 650 MILLIGRAM(S): 325 TABLET ORAL at 17:49

## 2024-09-02 RX ADMIN — KETOROLAC TROMETHAMINE 15 MILLIGRAM(S): 30 INJECTION, SOLUTION INTRAMUSCULAR at 06:55

## 2024-09-02 RX ADMIN — AMPICILLIN SODIUM AND SULBACTAM SODIUM 200 GRAM(S): 1; .5 INJECTION, POWDER, FOR SOLUTION INTRAMUSCULAR; INTRAVENOUS at 23:05

## 2024-09-02 RX ADMIN — NIFEDIPINE 30 MILLIGRAM(S): 60 TABLET, FILM COATED, EXTENDED RELEASE ORAL at 10:02

## 2024-09-02 RX ADMIN — Medication 1 TABLET(S): at 11:39

## 2024-09-02 RX ADMIN — AMPICILLIN SODIUM AND SULBACTAM SODIUM 200 GRAM(S): 1; .5 INJECTION, POWDER, FOR SOLUTION INTRAMUSCULAR; INTRAVENOUS at 14:15

## 2024-09-02 RX ADMIN — ENOXAPARIN SODIUM 40 MILLIGRAM(S): 100 INJECTION SUBCUTANEOUS at 11:39

## 2024-09-02 RX ADMIN — Medication 250 MILLIGRAM(S): at 10:02

## 2024-09-02 RX ADMIN — KETOROLAC TROMETHAMINE 15 MILLIGRAM(S): 30 INJECTION, SOLUTION INTRAMUSCULAR at 14:00

## 2024-09-02 RX ADMIN — KETOROLAC TROMETHAMINE 15 MILLIGRAM(S): 30 INJECTION, SOLUTION INTRAMUSCULAR at 13:13

## 2024-09-02 NOTE — PROGRESS NOTE ADULT - PROBLEM SELECTOR PLAN 5
Smokes cocaine and crack, last smoked 1 month ago  -f/u Utox, blood drug screen  - f/u HIV screen (high risk factors like unprotected sex w/ mult partners)  - consider STI screening outpatient - Not on any home meds, p/w /104 s/p lopressor 50x1,  Hydral 50x1 in ED  EKG: NSR, HR-96, QTc-454  Plan:  - Get Baseline TTE since pt has uncontrolled HTN and not on meds, concern for borderline LVH on EKG (still not meeting criteria for LVH R wave V5+S wave V1<35MM)  - Start nifedipine xl 30mg qd (h/o cocaine abuse, avoid BB) Smokes cocaine and crack, last smoked 1 month ago  Urine: THC +, Cocaine +  HIV: Non Reactive     Plan:  -F/u blood drug screen  -Consider STI screening outpatient

## 2024-09-02 NOTE — PROGRESS NOTE ADULT - PROBLEM SELECTOR PLAN 1
Presentation: Pt meets criteria for sepsis (SIRS 2/4 (WBC>12, HR>90), Source: R foot cellulitis) s/p clindamycin in the ED. Recently d/c from [facility] on Cephalexin 500mg  Currently WWP, mentating well, and making urine.    9/1: CT Foot:  Ill-defined moderate volume abscess involving right second and third distal intermetatarsal bursae and fourth MTP joint in the proper clinical setting. No tracking emphysema.  -start Vancomycin 1g q12h (15mg/kg)  - check vanc trough before 4th dose  - f/u blood cultures  - f/u urine cx  - consulted podiatry  - Get SW on board  - f/u X ray foot  -f/u ESR, CRP Presentation: Pt meets criteria for sepsis (SIRS 2/4 (WBC>12, HR>90), with SBP drop > 40 2/2 sepsis  Source: R foot cellulitis) s/p clindamycin in the ED. Recently d/c from [facility] on Cephalexin 500mg  Currently WWP, mentating well, and making urine.    9/1: CT Foot:  Ill-defined moderate volume abscess involving right second and third distal intermetatarsal bursae and fourth MTP joint in the proper clinical setting. No tracking emphysema.  -start Vancomycin 1g q12h (15mg/kg)  - check vanc trough before 4th dose  - f/u blood cultures  - f/u urine cx  - consulted podiatry  - Get SW on board  - f/u X ray foot  -f/u ESR, CRP Presentation: Pt meets criteria for sepsis (SIRS 2/4 (WBC>12, HR>90), with SBP drop > 40 2/2 sepsis  Source: R foot cellulitis) s/p clindamycin in the ED. Recently d/c from [facility] on Cephalexin 500mg  Currently WWP, mentating well, and making urine.    9/1: CT Foot:  Ill-defined moderate volume abscess involving right second and third distal intermetatarsal bursae and fourth MTP joint in the proper clinical setting. No tracking emphysema.  ESR: 68 | .6   9/2 X-ray Foot: Radiodense debris seen adjacent to the fifth MTP joint with chronic appearing erosive change of the fifth metatarsal head and deformity of the proximal phalanx.  9/2 MRI Foot: Hyperacute osteomyelitis/septic arthritis at right third and fourth   toe MTP joints. Peripherally enhancing 2.4 x 3.8 x 3.8 cm deep intramuscular abscess.  UA: Negative   s/p I/D x1 (9/2)    Plan:  -Start Vancomycin 1g q12h (15mg/kg)  -Started on Unasyn 3g Q6hr (9/2-  )  -F/u Blood cultures  -F/u Wound cultures  -F/u Urine cx  -Podiatry consulted and recs appreciated  -Get SW on board

## 2024-09-02 NOTE — PROGRESS NOTE ADULT - PROBLEM SELECTOR PLAN 3
Patient denies prior admisson for EtOh abuse, seizures or h/o intubations,   Last drink 2 days ago, Drank 1/3 bottle of tequila   CIWA- 1 (at time of assessment)  - f/u blood alcohol  - CIWA checks  - If CIWA>8, Ativan 1mg PRN every 15 mins  - Thiamine 100mg PO for 3 days.   - Folic acid 1mg IVP qd.  - MVI  - Regular diet  - Monitor electrolytes, replete PRN Presentation: R foot pain 2/2 cellulitis  CT Foot:  Ill-defined moderate volume abscess involving right second and third distal intermetatarsal bursae and fourth MTP joint in the proper clinical setting. No tracking emphysema.  - plan as above  - Pain management: Tylenol 650mg for mild, toradol 15mg Q6H IV PRN for moderate pain, oxy 2.5 q4h PRN for severe pain Presentation: R foot pain 2/2 cellulitis  CT Foot:  Ill-defined moderate volume abscess involving right second and third distal intermetatarsal bursae and fourth MTP joint in the proper clinical setting. No tracking emphysema.    Plan:  -Plan as above  -Pain management: Tylenol 650mg PO Q6hr standing, Ibuprofen 400mg PO Q6hr PRN

## 2024-09-02 NOTE — PROGRESS NOTE ADULT - PROBLEM SELECTOR PLAN 2
Presentation: R foot pain 2/2 cellulitis  CT Foot:  Ill-defined moderate volume abscess involving right second and third distal intermetatarsal bursae and fourth MTP joint in the proper clinical setting. No tracking emphysema.  - plan as above  - Pain management: Tylenol 650mg for mild, toradol 15mg Q6H IV PRN for moderate pain, oxy 2.5 q4h PRN for severe pain As above
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
## 2024-09-02 NOTE — PROGRESS NOTE ADULT - PROBLEM SELECTOR PLAN 4
- Not on any home meds, p/w /104 s/p lopressor 50x1,  Hydral 50x1 in ED  EKG: NSR, HR-96, QTc-454  Plan:  - Get Baseline TTE since pt has uncontrolled HTN and not on meds, concern for borderline LVH on EKG (still not meeting criteria for LVH R wave V5+S wave V1<35MM)  - Start nifedipine xl 30mg qd (h/o cocaine abuse, avoid BB) Patient denies prior admisson for EtOh abuse, seizures or h/o intubations,   Last drink 2 days ago, Drank 1/3 bottle of tequila   CIWA- 1 (at time of assessment)  - f/u blood alcohol  - CIWA checks  - If CIWA>8, Ativan 1mg PRN every 15 mins  - Thiamine 100mg PO for 3 days.   - Folic acid 1mg IVP qd.  - MVI  - Regular diet  - Monitor electrolytes, replete PRN Not on any home meds, p/w /104 s/p lopressor 50x1,  Hydral 50x1 in ED  EKG: NSR, HR-96, QTc-454    Plan:  -F/u TTE  -C/w Nifedipine xl 30mg QD

## 2024-09-02 NOTE — PROGRESS NOTE ADULT - PROBLEM SELECTOR PLAN 6
F: tolerating PO  E: replete K<4, Mg<2  N: DASH    VTE Prophylaxis: Lovenox 40mg q24h  D: RMF Smokes cocaine and crack, last smoked 1 month ago  -f/u Utox, blood drug screen  - f/u HIV screen (high risk factors like unprotected sex w/ mult partners)  - consider STI screening outpatient F: tolerating PO  E: replete K<4, Mg<2  N: DASH  VTE Prophylaxis: Lovenox 40mg q24h  D: RMF

## 2024-09-02 NOTE — PROGRESS NOTE ADULT - ATTENDING COMMENTS
Patient was seen and examined at bedside on 9/2/2024 at 11 am. Patient reports that he has pain at his R foot. Denies abdominal pain, SOB, N/V. ROS is otherwise negative. Vitals, labwork and pertinent imaging reviewed. Exam - NAD, AAO x 4, PERRLA, EOMI, MMM, supple neck, chest - CTA b/l, CV - irregular, no m/r/g, abd - soft, NTND, + BS, ext - wwp, RLE foot w/ fluctuance, warmth, psych - normal affect, skin - no rash    Plan:  -C/w Vanc, add Unasyn (for anaerobic and Gram negative coverage)  -Podiatry consulted for I/D of abscess  -F/u cultures (wound and blood)  -MRI of RLE  -C/w Nifedipine  -Pain control  -SW - patient is interested in shelter  -Nutrition consult

## 2024-09-02 NOTE — PROGRESS NOTE ADULT - SUBJECTIVE AND OBJECTIVE BOX
INTERVAL/OVERNIGHT EVENTS: None    SUBJECTIVE:  Patient seen and examined at bedside, comfortable, NAD. Denied fever, chest pain, dyspnea, abdominal pain.     Vital Signs Last 12 Hrs  T(F): 99.3 (09-02-24 @ 12:55), Max: 99.3 (09-02-24 @ 07:15)  HR: 96 (09-02-24 @ 12:55) (96 - 100)  BP: 129/83 (09-02-24 @ 12:55) (129/83 - 146/86)  BP(mean): --  RR: 18 (09-02-24 @ 12:55) (18 - 18)  SpO2: 98% (09-02-24 @ 12:55) (97% - 98%)  I&O's Summary      PHYSICAL EXAM:  General: NAD  HEENT: PERRL, EOM intact, sclera anicteric, MMM  Cardiovascular: RRR; no MRG;   Respiratory: CTAB; no WRR  GI/: soft; NTND; BS x4  Extremities: WWP; 2+ peripheral pulses bilaterally; no LE edema  Skin: normal color & turgor; no rash; right foot swollen forefoot, warm & ttp, w fluctuance on the dorsum, peeling of superficial epidermis  Neurologic: aox3; no focal deficits      LABS:                        15.2   14.52 )-----------( 363      ( 02 Sep 2024 05:30 )             45.3     09-02    138  |  100  |  11  ----------------------------<  117<H>  3.8   |  30  |  0.92    Ca    9.0      02 Sep 2024 05:30  Phos  3.4     09-02  Mg     2.3     09-02    TPro  7.3  /  Alb  3.5  /  TBili  0.3  /  DBili  x   /  AST  19  /  ALT  15  /  AlkPhos  68  09-02      Urinalysis Basic - ( 02 Sep 2024 05:30 )    Color: x / Appearance: x / SG: x / pH: x  Gluc: 117 mg/dL / Ketone: x  / Bili: x / Urobili: x   Blood: x / Protein: x / Nitrite: x   Leuk Esterase: x / RBC: x / WBC x   Sq Epi: x / Non Sq Epi: x / Bacteria: x          RADIOLOGY & ADDITIONAL TESTS:    MEDICATIONS  (STANDING):  enoxaparin Injectable 40 milliGRAM(s) SubCutaneous every 24 hours  folic acid 1 milliGRAM(s) Oral daily  lidocaine   4% Patch 1 Patch Transdermal daily  multivitamin 1 Tablet(s) Oral daily  naloxone Injectable 0.4 milliGRAM(s) IV Push once  NIFEdipine XL 30 milliGRAM(s) Oral daily  polyethylene glycol 3350 17 Gram(s) Oral daily  senna 2 Tablet(s) Oral at bedtime  thiamine 100 milliGRAM(s) Oral daily  vancomycin  IVPB 1000 milliGRAM(s) IV Intermittent every 12 hours    MEDICATIONS  (PRN):  acetaminophen     Tablet .. 650 milliGRAM(s) Oral every 6 hours PRN Temp greater or equal to 38C (100.4F), Mild Pain (1 - 3)  aluminum hydroxide/magnesium hydroxide/simethicone Suspension 30 milliLiter(s) Oral every 4 hours PRN Dyspepsia  bisacodyl 5 milliGRAM(s) Oral daily PRN Constipation  HYDROmorphone  Injectable 0.5 milliGRAM(s) IV Push once PRN breakthrough pain  ketorolac   Injectable 15 milliGRAM(s) IV Push every 6 hours PRN Moderate Pain (4 - 6)  melatonin 3 milliGRAM(s) Oral at bedtime PRN Insomnia  ondansetron Injectable 4 milliGRAM(s) IV Push every 8 hours PRN Nausea and/or Vomiting  oxyCODONE    IR 2.5 milliGRAM(s) Oral every 4 hours PRN Severe Pain (7 - 10)
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
## 2024-09-03 DIAGNOSIS — M86.10 OTHER ACUTE OSTEOMYELITIS, UNSPECIFIED SITE: ICD-10-CM

## 2024-09-03 LAB
ALBUMIN SERPL ELPH-MCNC: 3 G/DL — LOW (ref 3.3–5)
ALP SERPL-CCNC: 61 U/L — SIGNIFICANT CHANGE UP (ref 40–120)
ALT FLD-CCNC: 16 U/L — SIGNIFICANT CHANGE UP (ref 10–45)
ANION GAP SERPL CALC-SCNC: 12 MMOL/L — SIGNIFICANT CHANGE UP (ref 5–17)
AST SERPL-CCNC: 18 U/L — SIGNIFICANT CHANGE UP (ref 10–40)
BASOPHILS # BLD AUTO: 0.06 K/UL — SIGNIFICANT CHANGE UP (ref 0–0.2)
BASOPHILS NFR BLD AUTO: 0.6 % — SIGNIFICANT CHANGE UP (ref 0–2)
BILIRUB SERPL-MCNC: 0.2 MG/DL — SIGNIFICANT CHANGE UP (ref 0.2–1.2)
BUN SERPL-MCNC: 12 MG/DL — SIGNIFICANT CHANGE UP (ref 7–23)
CALCIUM SERPL-MCNC: 8.7 MG/DL — SIGNIFICANT CHANGE UP (ref 8.4–10.5)
CHLORIDE SERPL-SCNC: 103 MMOL/L — SIGNIFICANT CHANGE UP (ref 96–108)
CO2 SERPL-SCNC: 25 MMOL/L — SIGNIFICANT CHANGE UP (ref 22–31)
CREAT SERPL-MCNC: 0.81 MG/DL — SIGNIFICANT CHANGE UP (ref 0.5–1.3)
EGFR: 107 ML/MIN/1.73M2 — SIGNIFICANT CHANGE UP
EOSINOPHIL # BLD AUTO: 0.28 K/UL — SIGNIFICANT CHANGE UP (ref 0–0.5)
EOSINOPHIL NFR BLD AUTO: 2.7 % — SIGNIFICANT CHANGE UP (ref 0–6)
GLUCOSE SERPL-MCNC: 102 MG/DL — HIGH (ref 70–99)
HCT VFR BLD CALC: 41.6 % — SIGNIFICANT CHANGE UP (ref 39–50)
HGB BLD-MCNC: 13.7 G/DL — SIGNIFICANT CHANGE UP (ref 13–17)
IMM GRANULOCYTES NFR BLD AUTO: 0.4 % — SIGNIFICANT CHANGE UP (ref 0–0.9)
LYMPHOCYTES # BLD AUTO: 1.3 K/UL — SIGNIFICANT CHANGE UP (ref 1–3.3)
LYMPHOCYTES # BLD AUTO: 12.5 % — LOW (ref 13–44)
MAGNESIUM SERPL-MCNC: 2.2 MG/DL — SIGNIFICANT CHANGE UP (ref 1.6–2.6)
MCHC RBC-ENTMCNC: 28.8 PG — SIGNIFICANT CHANGE UP (ref 27–34)
MCHC RBC-ENTMCNC: 32.9 GM/DL — SIGNIFICANT CHANGE UP (ref 32–36)
MCV RBC AUTO: 87.4 FL — SIGNIFICANT CHANGE UP (ref 80–100)
MONOCYTES # BLD AUTO: 1.03 K/UL — HIGH (ref 0–0.9)
MONOCYTES NFR BLD AUTO: 9.9 % — SIGNIFICANT CHANGE UP (ref 2–14)
NEUTROPHILS # BLD AUTO: 7.67 K/UL — HIGH (ref 1.8–7.4)
NEUTROPHILS NFR BLD AUTO: 73.9 % — SIGNIFICANT CHANGE UP (ref 43–77)
NRBC # BLD: 0 /100 WBCS — SIGNIFICANT CHANGE UP (ref 0–0)
PHOSPHATE SERPL-MCNC: 3.8 MG/DL — SIGNIFICANT CHANGE UP (ref 2.5–4.5)
PLATELET # BLD AUTO: 370 K/UL — SIGNIFICANT CHANGE UP (ref 150–400)
POTASSIUM SERPL-MCNC: 3.7 MMOL/L — SIGNIFICANT CHANGE UP (ref 3.5–5.3)
POTASSIUM SERPL-SCNC: 3.7 MMOL/L — SIGNIFICANT CHANGE UP (ref 3.5–5.3)
PROT SERPL-MCNC: 6.5 G/DL — SIGNIFICANT CHANGE UP (ref 6–8.3)
RBC # BLD: 4.76 M/UL — SIGNIFICANT CHANGE UP (ref 4.2–5.8)
RBC # FLD: 12.4 % — SIGNIFICANT CHANGE UP (ref 10.3–14.5)
SODIUM SERPL-SCNC: 140 MMOL/L — SIGNIFICANT CHANGE UP (ref 135–145)
VANCOMYCIN TROUGH SERPL-MCNC: 9 UG/ML — LOW (ref 10–20)
WBC # BLD: 10.38 K/UL — SIGNIFICANT CHANGE UP (ref 3.8–10.5)
WBC # FLD AUTO: 10.38 K/UL — SIGNIFICANT CHANGE UP (ref 3.8–10.5)

## 2024-09-03 PROCEDURE — 99233 SBSQ HOSP IP/OBS HIGH 50: CPT | Mod: GC

## 2024-09-03 PROCEDURE — 99222 1ST HOSP IP/OBS MODERATE 55: CPT

## 2024-09-03 RX ORDER — HYDROMORPHONE HYDROCHLORIDE 2 MG/1
1 TABLET ORAL ONCE
Refills: 0 | Status: DISCONTINUED | OUTPATIENT
Start: 2024-09-03 | End: 2024-09-03

## 2024-09-03 RX ORDER — LIDOCAINE HCL 20 MG/ML
20 VIAL (ML) INJECTION ONCE
Refills: 0 | Status: COMPLETED | OUTPATIENT
Start: 2024-09-03 | End: 2024-09-03

## 2024-09-03 RX ORDER — VANCOMYCIN/0.9 % SOD CHLORIDE 1.75G/25
1000 PLASTIC BAG, INJECTION (ML) INTRAVENOUS EVERY 8 HOURS
Refills: 0 | Status: COMPLETED | OUTPATIENT
Start: 2024-09-03 | End: 2024-09-04

## 2024-09-03 RX ADMIN — Medication 250 MILLIGRAM(S): at 14:21

## 2024-09-03 RX ADMIN — ACETAMINOPHEN 650 MILLIGRAM(S): 325 TABLET ORAL at 18:33

## 2024-09-03 RX ADMIN — ACETAMINOPHEN 650 MILLIGRAM(S): 325 TABLET ORAL at 06:31

## 2024-09-03 RX ADMIN — Medication 1 APPLICATION(S): at 06:32

## 2024-09-03 RX ADMIN — Medication 1 TABLET(S): at 11:40

## 2024-09-03 RX ADMIN — AMPICILLIN SODIUM AND SULBACTAM SODIUM 200 GRAM(S): 1; .5 INJECTION, POWDER, FOR SOLUTION INTRAMUSCULAR; INTRAVENOUS at 23:22

## 2024-09-03 RX ADMIN — ACETAMINOPHEN 650 MILLIGRAM(S): 325 TABLET ORAL at 07:31

## 2024-09-03 RX ADMIN — HYDROMORPHONE HYDROCHLORIDE 0.5 MILLIGRAM(S): 2 TABLET ORAL at 15:20

## 2024-09-03 RX ADMIN — Medication 250 MILLIGRAM(S): at 21:45

## 2024-09-03 RX ADMIN — AMPICILLIN SODIUM AND SULBACTAM SODIUM 200 GRAM(S): 1; .5 INJECTION, POWDER, FOR SOLUTION INTRAMUSCULAR; INTRAVENOUS at 18:33

## 2024-09-03 RX ADMIN — Medication 20 MILLILITER(S): at 15:22

## 2024-09-03 RX ADMIN — ACETAMINOPHEN 650 MILLIGRAM(S): 325 TABLET ORAL at 23:22

## 2024-09-03 RX ADMIN — ACETAMINOPHEN 650 MILLIGRAM(S): 325 TABLET ORAL at 11:40

## 2024-09-03 RX ADMIN — ENOXAPARIN SODIUM 40 MILLIGRAM(S): 100 INJECTION SUBCUTANEOUS at 11:41

## 2024-09-03 RX ADMIN — Medication 1 APPLICATION(S): at 06:38

## 2024-09-03 RX ADMIN — AMPICILLIN SODIUM AND SULBACTAM SODIUM 200 GRAM(S): 1; .5 INJECTION, POWDER, FOR SOLUTION INTRAMUSCULAR; INTRAVENOUS at 06:31

## 2024-09-03 RX ADMIN — NIFEDIPINE 30 MILLIGRAM(S): 60 TABLET, FILM COATED, EXTENDED RELEASE ORAL at 11:40

## 2024-09-03 RX ADMIN — HYDROMORPHONE HYDROCHLORIDE 0.5 MILLIGRAM(S): 2 TABLET ORAL at 13:33

## 2024-09-03 RX ADMIN — ACETAMINOPHEN 650 MILLIGRAM(S): 325 TABLET ORAL at 11:59

## 2024-09-03 RX ADMIN — HYDROMORPHONE HYDROCHLORIDE 1 MILLIGRAM(S): 2 TABLET ORAL at 08:26

## 2024-09-03 RX ADMIN — AMPICILLIN SODIUM AND SULBACTAM SODIUM 200 GRAM(S): 1; .5 INJECTION, POWDER, FOR SOLUTION INTRAMUSCULAR; INTRAVENOUS at 11:41

## 2024-09-03 RX ADMIN — HYDROMORPHONE HYDROCHLORIDE 1 MILLIGRAM(S): 2 TABLET ORAL at 08:40
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
## 2024-09-03 NOTE — PROGRESS NOTE ADULT - PROBLEM SELECTOR PLAN 4
Presentation: R foot pain 2/2 cellulitis  CT Foot:  Ill-defined moderate volume abscess involving right second and third distal intermetatarsal bursae and fourth MTP joint in the proper clinical setting. No tracking emphysema.    Plan:  -Plan as above  -Pain management: Tylenol 650mg PO Q6hr standing, Ibuprofen 400mg PO Q6hr PRN

## 2024-09-03 NOTE — PROGRESS NOTE ADULT - PROBLEM SELECTOR PLAN 3
As above 9/2 MRI Foot: Hyperacute osteomyelitis/septic arthritis at right third and fourth   toe MTP joints. Peripherally enhancing 2.4 x 3.8 x 3.8 cm deep intramuscular abscess.  s/p I/D x1 (9/2) by podiatry     Plan:  -As above

## 2024-09-03 NOTE — PROGRESS NOTE ADULT - PROBLEM SELECTOR PLAN 1
Presentation: Pt meets criteria for sepsis (SIRS 2/4 (WBC>12, HR>90), with SBP drop > 40 2/2 sepsis  Source: R foot cellulitis) s/p clindamycin in the ED. Recently d/c from [facility] on Cephalexin 500mg  Currently WWP, mentating well, and making urine.    9/1: CT Foot:  Ill-defined moderate volume abscess involving right second and third distal intermetatarsal bursae and fourth MTP joint in the proper clinical setting. No tracking emphysema.  ESR: 68 | .6   9/2 X-ray Foot: Radiodense debris seen adjacent to the fifth MTP joint with chronic appearing erosive change of the fifth metatarsal head and deformity of the proximal phalanx.  9/2 MRI Foot: Hyperacute osteomyelitis/septic arthritis at right third and fourth   toe MTP joints. Peripherally enhancing 2.4 x 3.8 x 3.8 cm deep intramuscular abscess.  UA: Negative   s/p I/D x1 (9/2)    Plan:  -Start Vancomycin 1g q12h (15mg/kg)  -Started on Unasyn 3g Q6hr (9/2-  )  -F/u Blood cultures  -F/u Wound cultures  -F/u Urine cx  -Podiatry consulted and recs appreciated  -Get SW on board Presentation: Pt meets criteria for sepsis (SIRS 2/4 (WBC>12, HR>90), with SBP drop > 40 2/2 sepsis  Source: R foot cellulitis) s/p clindamycin in the ED. Recently d/c from [facility] on Cephalexin 500mg  Currently WWP, mentating well, and making urine.    9/1: CT Foot:  Ill-defined moderate volume abscess involving right second and third distal intermetatarsal bursae and fourth MTP joint in the proper clinical setting. No tracking emphysema.  ESR: 68 | .6   9/2 X-ray Foot: Radiodense debris seen adjacent to the fifth MTP joint with chronic appearing erosive change of the fifth metatarsal head and deformity of the proximal phalanx.  9/2 MRI Foot: Hyperacute osteomyelitis/septic arthritis at right third and fourth   toe MTP joints. Peripherally enhancing 2.4 x 3.8 x 3.8 cm deep intramuscular abscess.  UA: Negative       Plan:  -Start Vancomycin 1g q12h (15mg/kg)  -Started on Unasyn 3g Q6hr (9/2-  )  -F/u Blood cultures  -F/u Wound cultures  -F/u Urine cx  -Podiatry consulted and recs appreciated  -ID consulted and recs appreciated
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
## 2024-09-03 NOTE — PROGRESS NOTE ADULT - PROBLEM SELECTOR PLAN 2
9/1: CT Foot:  Ill-defined moderate volume abscess involving right second and third distal intermetatarsal bursae and fourth MTP joint in the proper clinical setting. No tracking emphysema.  ESR: 68 | .6   9/2 X-ray Foot: Radiodense debris seen adjacent to the fifth MTP joint with chronic appearing erosive change of the fifth metatarsal head and deformity of the proximal phalanx.  9/2 MRI Foot: Hyperacute osteomyelitis/septic arthritis at right third and fourth   toe MTP joints. Peripherally enhancing 2.4 x 3.8 x 3.8 cm deep intramuscular abscess.    Plan:   -Continue with plan listed above

## 2024-09-03 NOTE — PROGRESS NOTE ADULT - SUBJECTIVE AND OBJECTIVE BOX
***Note in progress***    OVERNIGHT EVENTS: NAEO    SUBJECTIVE / INTERVAL HPI: Patient seen and examined at bedside. Patient denying chest pain, SOB, palpitations, cough.     Remaining ROS negative       PHYSICAL EXAM:  General:NAD, appears comfortable    HEENT:  PERRL, anicteric sclera  Cardiovascular:  RRR  Respiratory: CTA B/L  Gastrointestinal: soft, NT/ND; +BSx4  Extremities: no edema to LE  Vascular: 2+ radial pulses  Neurological: AAOx3; no focal deficits  Psychiatric: pleasant mood and affect  Dermatologic: no appreciable wounds or damage to the skin    VITAL SIGNS:  Vital Signs Last 24 Hrs  T(C): 36.7 (03 Sep 2024 05:48), Max: 37.4 (02 Sep 2024 12:55)  T(F): 98 (03 Sep 2024 05:48), Max: 99.3 (02 Sep 2024 12:55)  HR: 77 (03 Sep 2024 05:48) (77 - 100)  BP: 133/87 (03 Sep 2024 05:48) (111/77 - 146/86)  BP(mean): --  RR: 18 (03 Sep 2024 05:48) (18 - 18)  SpO2: 97% (03 Sep 2024 05:48) (95% - 98%)    Parameters below as of 03 Sep 2024 05:48  Patient On (Oxygen Delivery Method): room air          MEDICATIONS:  MEDICATIONS  (STANDING):  acetaminophen     Tablet .. 650 milliGRAM(s) Oral every 6 hours  ampicillin/sulbactam  IVPB 3 Gram(s) IV Intermittent every 6 hours  enoxaparin Injectable 40 milliGRAM(s) SubCutaneous every 24 hours  lidocaine   4% Patch 1 Patch Transdermal daily  multivitamin 1 Tablet(s) Oral daily  naloxone Injectable 0.4 milliGRAM(s) IV Push once  NIFEdipine XL 30 milliGRAM(s) Oral daily  polyethylene glycol 3350 17 Gram(s) Oral daily  senna 2 Tablet(s) Oral at bedtime  vancomycin  IVPB 1000 milliGRAM(s) IV Intermittent every 8 hours    MEDICATIONS  (PRN):  aluminum hydroxide/magnesium hydroxide/simethicone Suspension 30 milliLiter(s) Oral every 4 hours PRN Dyspepsia  bisacodyl 5 milliGRAM(s) Oral daily PRN Constipation  HYDROmorphone  Injectable 0.5 milliGRAM(s) IV Push once PRN breakthrough pain  ibuprofen  Tablet. 400 milliGRAM(s) Oral every 6 hours PRN Moderate Pain (4 - 6)  melatonin 3 milliGRAM(s) Oral at bedtime PRN Insomnia  ondansetron Injectable 4 milliGRAM(s) IV Push every 8 hours PRN Nausea and/or Vomiting      ALLERGIES:  Allergies    No Known Allergies    Intolerances        LABS:                        13.7   10.38 )-----------( 370      ( 03 Sep 2024 05:30 )             41.6     09-03    140  |  103  |  12  ----------------------------<  102<H>  3.7   |  25  |  0.81    Ca    8.7      03 Sep 2024 05:30  Phos  3.8     09-03  Mg     2.2     09-03    TPro  6.5  /  Alb  3.0<L>  /  TBili  0.2  /  DBili  x   /  AST  18  /  ALT  16  /  AlkPhos  61  09-03      Urinalysis Basic - ( 03 Sep 2024 05:30 )    Color: x / Appearance: x / SG: x / pH: x  Gluc: 102 mg/dL / Ketone: x  / Bili: x / Urobili: x   Blood: x / Protein: x / Nitrite: x   Leuk Esterase: x / RBC: x / WBC x   Sq Epi: x / Non Sq Epi: x / Bacteria: x      CAPILLARY BLOOD GLUCOSE          RADIOLOGY & ADDITIONAL TESTS: Reviewed.   OVERNIGHT EVENTS: NAEO    SUBJECTIVE / INTERVAL HPI: Patient seen and examined at bedside this morning. Pt offers no c/o any sort. Denies chest pain, sob, abdominal pain, cough or n/v/d.    Remaining ROS negative     PHYSICAL EXAM:  General: NAD, appears comfortable    HEENT:  PERRL, anicteric sclera  Cardiovascular:  RRR  Respiratory: CTA B/L  Gastrointestinal: soft, NT/ND; +BSx4  Extremities: no edema to LE, RLE wrapped   Vascular: 2+ radial pulses  Neurological: AAOx3; no focal deficits    VITAL SIGNS:  Vital Signs Last 24 Hrs  T(C): 36.7 (03 Sep 2024 05:48), Max: 37.4 (02 Sep 2024 12:55)  T(F): 98 (03 Sep 2024 05:48), Max: 99.3 (02 Sep 2024 12:55)  HR: 77 (03 Sep 2024 05:48) (77 - 100)  BP: 133/87 (03 Sep 2024 05:48) (111/77 - 146/86)  BP(mean): --  RR: 18 (03 Sep 2024 05:48) (18 - 18)  SpO2: 97% (03 Sep 2024 05:48) (95% - 98%)    Parameters below as of 03 Sep 2024 05:48  Patient On (Oxygen Delivery Method): room air    MEDICATIONS:  MEDICATIONS  (STANDING):  acetaminophen     Tablet .. 650 milliGRAM(s) Oral every 6 hours  ampicillin/sulbactam  IVPB 3 Gram(s) IV Intermittent every 6 hours  enoxaparin Injectable 40 milliGRAM(s) SubCutaneous every 24 hours  lidocaine   4% Patch 1 Patch Transdermal daily  multivitamin 1 Tablet(s) Oral daily  naloxone Injectable 0.4 milliGRAM(s) IV Push once  NIFEdipine XL 30 milliGRAM(s) Oral daily  polyethylene glycol 3350 17 Gram(s) Oral daily  senna 2 Tablet(s) Oral at bedtime  vancomycin  IVPB 1000 milliGRAM(s) IV Intermittent every 8 hours    MEDICATIONS  (PRN):  aluminum hydroxide/magnesium hydroxide/simethicone Suspension 30 milliLiter(s) Oral every 4 hours PRN Dyspepsia  bisacodyl 5 milliGRAM(s) Oral daily PRN Constipation  HYDROmorphone  Injectable 0.5 milliGRAM(s) IV Push once PRN breakthrough pain  ibuprofen  Tablet. 400 milliGRAM(s) Oral every 6 hours PRN Moderate Pain (4 - 6)  melatonin 3 milliGRAM(s) Oral at bedtime PRN Insomnia  ondansetron Injectable 4 milliGRAM(s) IV Push every 8 hours PRN Nausea and/or Vomiting      ALLERGIES:  Allergies  No Known Allergies  Intolerances        LABS:                        13.7   10.38 )-----------( 370      ( 03 Sep 2024 05:30 )             41.6     09-03    140  |  103  |  12  ----------------------------<  102<H>  3.7   |  25  |  0.81    Ca    8.7      03 Sep 2024 05:30  Phos  3.8     09-03  Mg     2.2     09-03    TPro  6.5  /  Alb  3.0<L>  /  TBili  0.2  /  DBili  x   /  AST  18  /  ALT  16  /  AlkPhos  61  09-03      Urinalysis Basic - ( 03 Sep 2024 05:30 )    Color: x / Appearance: x / SG: x / pH: x  Gluc: 102 mg/dL / Ketone: x  / Bili: x / Urobili: x   Blood: x / Protein: x / Nitrite: x   Leuk Esterase: x / RBC: x / WBC x   Sq Epi: x / Non Sq Epi: x / Bacteria: x      CAPILLARY BLOOD GLUCOSE          RADIOLOGY & ADDITIONAL TESTS: Reviewed.
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
## 2024-09-03 NOTE — PROGRESS NOTE ADULT - ATTENDING COMMENTS
50 yo M with a PMH of HTN, lower extremity ulcers, presented with R foot pain and found to have ulcers on the foot, now with podiatry following s/p I&D.     VS reviewed and stable     Exam:   Appears comfortable on room air   MMM  Normal WOB on RA, CTAB   RRR, no mrg   Abdomen soft, nontender, nondistended  Extremities warm and without edema, R foot bandage clean, dry, intact   AOX3, no focal neuro deficits     Labs reviewed, at baseline     Imaging reviewed, concerning for MRI foot showing intramuscular abscess in R foot along with 3rd and 4th metatarsal possible OM.     Plan:   -Continue vancomycin (troughs being monitored)/unasyn for R foot abscess and OM   -Consult ID for help with abx recommendations, course  -Appreciate podiatry involvement, s/p I&D, discuss with them whether any further interventions or debridement is indicated, any potential for bone biopsy iso OM   -follow up blood cultures, wound cultures   -Can continue low dose nifedipine for blood pressure    Rest as per resident note.

## 2024-09-03 NOTE — PROGRESS NOTE ADULT - SUBJECTIVE AND OBJECTIVE BOX
Patient is a 49y old  Male who presents with a chief complaint of Sepsis 2/2 R foot cellulitis (03 Sep 2024 07:53)      INTERVAL HPI/ OVERNIGHT EVENTS. S/p Right foot incision and drainage 9/2. Sanguinous drainage noted on dressing. No acute events.       LABS                        13.7   10.38 )-----------( 370      ( 03 Sep 2024 05:30 )             41.6     09-03    140  |  103  |  12  ----------------------------<  102<H>  3.7   |  25  |  0.81    Ca    8.7      03 Sep 2024 05:30  Phos  3.8     09-03  Mg     2.2     09-03    TPro  6.5  /  Alb  3.0<L>  /  TBili  0.2  /  DBili  x   /  AST  18  /  ALT  16  /  AlkPhos  61  09-03        ICU Vital Signs Last 24 Hrs  T(C): 36.8 (03 Sep 2024 11:27), Max: 37.4 (02 Sep 2024 12:55)  T(F): 98.2 (03 Sep 2024 11:27), Max: 99.3 (02 Sep 2024 12:55)  HR: 93 (03 Sep 2024 11:27) (77 - 96)  BP: 117/77 (03 Sep 2024 11:27) (111/77 - 133/87)  BP(mean): --  ABP: --  ABP(mean): --  RR: 16 (03 Sep 2024 11:27) (16 - 18)  SpO2: 99% (03 Sep 2024 11:27) (95% - 99%)    O2 Parameters below as of 03 Sep 2024 11:27  Patient On (Oxygen Delivery Method): room air            RADIOLOGY  < from: MR Foot w/wo IV Cont, Right (09.02.24 @ 17:20) >  IMPRESSION:    1.  Peripherally enhancing 2.4 x 3.8 x 3.8 cm deep intramuscular abscess   along the dorsolateral margin of right forefoot overlying and   communicating with the third and fourth toe MTP joints.  2.  Hyperacute osteomyelitis/septic arthritis at right third and fourth   toe MTP joints.    --- End of Report ---    < end of copied text >    < from: CT Foot w/ Pt IV Cont, Right (09.01.24 @ 07:09) >    IMPRESSION:    1.  Ill-defined moderate volume abscess involving right second and third   distal intermetatarsal bursae and fourth MTP joint in the proper clinical   setting. No tracking emphysema.  2.  Consider follow-up MRI as clinically indicated.    --- End of Report ---    < end of copied text >    < from: Xray Foot AP + Lateral, Right (09.02.24 @ 08:47) >  IMPRESSION: Radiodense debris seen adjacent to the fifth MTP joint with   chronic appearing erosive change of the fifth metatarsal head and   deformity of the proximal phalanx. Correlation with prior injury is   recommended.    There is diffuse dorsal soft tissue swelling.    --- End of Report ---    < end of copied text >      MICROBIOLOGY      PHYSICAL EXAM  Lower Extremity Focused  Vasc: DP, PT 2/4. TG warm to warm. Edema to R dorsal foot.   Derm: R dorsal foot wound at 3rd mtpj. Purulent drainage. 2-3 ccs of purulence expressed. + fluctuance. Surrounding erythema. Right sub met 2 hyperkeratotic skin.   Neuro: Protective sensation intact.  MSK: +TTP to Right dorsal foot  Patient is a 49y old  Male who presents with a chief complaint of Sepsis 2/2 R foot cellulitis (03 Sep 2024 07:53)      INTERVAL HPI/ OVERNIGHT EVENTS. Pt seen bedside with Attending. S/p Right foot incision and drainage 9/2. Sanguinous drainage noted on dressing. No acute events.       LABS                        13.7   10.38 )-----------( 370      ( 03 Sep 2024 05:30 )             41.6     09-03    140  |  103  |  12  ----------------------------<  102<H>  3.7   |  25  |  0.81    Ca    8.7      03 Sep 2024 05:30  Phos  3.8     09-03  Mg     2.2     09-03    TPro  6.5  /  Alb  3.0<L>  /  TBili  0.2  /  DBili  x   /  AST  18  /  ALT  16  /  AlkPhos  61  09-03        ICU Vital Signs Last 24 Hrs  T(C): 36.8 (03 Sep 2024 11:27), Max: 37.4 (02 Sep 2024 12:55)  T(F): 98.2 (03 Sep 2024 11:27), Max: 99.3 (02 Sep 2024 12:55)  HR: 93 (03 Sep 2024 11:27) (77 - 96)  BP: 117/77 (03 Sep 2024 11:27) (111/77 - 133/87)  BP(mean): --  ABP: --  ABP(mean): --  RR: 16 (03 Sep 2024 11:27) (16 - 18)  SpO2: 99% (03 Sep 2024 11:27) (95% - 99%)    O2 Parameters below as of 03 Sep 2024 11:27  Patient On (Oxygen Delivery Method): room air            RADIOLOGY  < from: MR Foot w/wo IV Cont, Right (09.02.24 @ 17:20) >  IMPRESSION:    1.  Peripherally enhancing 2.4 x 3.8 x 3.8 cm deep intramuscular abscess   along the dorsolateral margin of right forefoot overlying and   communicating with the third and fourth toe MTP joints.  2.  Hyperacute osteomyelitis/septic arthritis at right third and fourth   toe MTP joints.    --- End of Report ---    < end of copied text >    < from: CT Foot w/ Pt IV Cont, Right (09.01.24 @ 07:09) >    IMPRESSION:    1.  Ill-defined moderate volume abscess involving right second and third   distal intermetatarsal bursae and fourth MTP joint in the proper clinical   setting. No tracking emphysema.  2.  Consider follow-up MRI as clinically indicated.    --- End of Report ---    < end of copied text >    < from: Xray Foot AP + Lateral, Right (09.02.24 @ 08:47) >  IMPRESSION: Radiodense debris seen adjacent to the fifth MTP joint with   chronic appearing erosive change of the fifth metatarsal head and   deformity of the proximal phalanx. Correlation with prior injury is   recommended.    There is diffuse dorsal soft tissue swelling.    --- End of Report ---    < end of copied text >      MICROBIOLOGY      PHYSICAL EXAM  Lower Extremity Focused  Vasc: DP, PT 2/4. TG warm to warm. Edema to R dorsal foot.   Derm: R dorsal foot wound at 3rd mtpj. Purulent drainage. 2-3 ccs of purulence expressed. + fluctuance. Surrounding erythema. Right sub met 2 hyperkeratotic skin.   Neuro: Protective sensation intact.  MSK: +TTP to Right dorsal foot

## 2024-09-03 NOTE — PROGRESS NOTE ADULT - PROBLEM SELECTOR PLAN 6
Smokes cocaine and crack, last smoked 1 month ago  Urine: THC +, Cocaine +  HIV: Non Reactive     Plan:  -F/u blood drug screen  -Consider STI screening outpatient

## 2024-09-03 NOTE — PROGRESS NOTE ADULT - PROBLEM SELECTOR PLAN 5
Not on any home meds, p/w /104 s/p lopressor 50x1,  Hydral 50x1 in ED  EKG: NSR, HR-96, QTc-454    Plan:  -F/u TTE  -C/w Nifedipine xl 30mg QD

## 2024-09-04 ENCOUNTER — RESULT REVIEW (OUTPATIENT)
Age: 49
End: 2024-09-04

## 2024-09-04 LAB
-  CLINDAMYCIN: SIGNIFICANT CHANGE UP
-  ERYTHROMYCIN: SIGNIFICANT CHANGE UP
-  GENTAMICIN: SIGNIFICANT CHANGE UP
-  LINEZOLID: SIGNIFICANT CHANGE UP
-  OXACILLIN: SIGNIFICANT CHANGE UP
-  PENICILLIN: SIGNIFICANT CHANGE UP
-  RIFAMPIN: SIGNIFICANT CHANGE UP
-  TETRACYCLINE: SIGNIFICANT CHANGE UP
-  TRIMETHOPRIM/SULFAMETHOXAZOLE: SIGNIFICANT CHANGE UP
-  VANCOMYCIN: SIGNIFICANT CHANGE UP
ALBUMIN SERPL ELPH-MCNC: 3.2 G/DL — LOW (ref 3.3–5)
ALP SERPL-CCNC: 66 U/L — SIGNIFICANT CHANGE UP (ref 40–120)
ALT FLD-CCNC: 21 U/L — SIGNIFICANT CHANGE UP (ref 10–45)
ANION GAP SERPL CALC-SCNC: 9 MMOL/L — SIGNIFICANT CHANGE UP (ref 5–17)
AST SERPL-CCNC: 25 U/L — SIGNIFICANT CHANGE UP (ref 10–40)
BASOPHILS # BLD AUTO: 0.07 K/UL — SIGNIFICANT CHANGE UP (ref 0–0.2)
BASOPHILS NFR BLD AUTO: 0.8 % — SIGNIFICANT CHANGE UP (ref 0–2)
BILIRUB SERPL-MCNC: <0.2 MG/DL — SIGNIFICANT CHANGE UP (ref 0.2–1.2)
BUN SERPL-MCNC: 9 MG/DL — SIGNIFICANT CHANGE UP (ref 7–23)
CALCIUM SERPL-MCNC: 8.9 MG/DL — SIGNIFICANT CHANGE UP (ref 8.4–10.5)
CHLORIDE SERPL-SCNC: 100 MMOL/L — SIGNIFICANT CHANGE UP (ref 96–108)
CO2 SERPL-SCNC: 27 MMOL/L — SIGNIFICANT CHANGE UP (ref 22–31)
CREAT SERPL-MCNC: 0.85 MG/DL — SIGNIFICANT CHANGE UP (ref 0.5–1.3)
EGFR: 105 ML/MIN/1.73M2 — SIGNIFICANT CHANGE UP
EOSINOPHIL # BLD AUTO: 0.37 K/UL — SIGNIFICANT CHANGE UP (ref 0–0.5)
EOSINOPHIL NFR BLD AUTO: 4.3 % — SIGNIFICANT CHANGE UP (ref 0–6)
GLUCOSE SERPL-MCNC: 109 MG/DL — HIGH (ref 70–99)
HCT VFR BLD CALC: 44.3 % — SIGNIFICANT CHANGE UP (ref 39–50)
HGB BLD-MCNC: 14.3 G/DL — SIGNIFICANT CHANGE UP (ref 13–17)
IMM GRANULOCYTES NFR BLD AUTO: 0.6 % — SIGNIFICANT CHANGE UP (ref 0–0.9)
LYMPHOCYTES # BLD AUTO: 1.29 K/UL — SIGNIFICANT CHANGE UP (ref 1–3.3)
LYMPHOCYTES # BLD AUTO: 15 % — SIGNIFICANT CHANGE UP (ref 13–44)
MAGNESIUM SERPL-MCNC: 2 MG/DL — SIGNIFICANT CHANGE UP (ref 1.6–2.6)
MCHC RBC-ENTMCNC: 29 PG — SIGNIFICANT CHANGE UP (ref 27–34)
MCHC RBC-ENTMCNC: 32.3 GM/DL — SIGNIFICANT CHANGE UP (ref 32–36)
MCV RBC AUTO: 89.9 FL — SIGNIFICANT CHANGE UP (ref 80–100)
METHOD TYPE: SIGNIFICANT CHANGE UP
MONOCYTES # BLD AUTO: 0.71 K/UL — SIGNIFICANT CHANGE UP (ref 0–0.9)
MONOCYTES NFR BLD AUTO: 8.3 % — SIGNIFICANT CHANGE UP (ref 2–14)
NEUTROPHILS # BLD AUTO: 6.11 K/UL — SIGNIFICANT CHANGE UP (ref 1.8–7.4)
NEUTROPHILS NFR BLD AUTO: 71 % — SIGNIFICANT CHANGE UP (ref 43–77)
NRBC # BLD: 0 /100 WBCS — SIGNIFICANT CHANGE UP (ref 0–0)
PHOSPHATE SERPL-MCNC: 4.3 MG/DL — SIGNIFICANT CHANGE UP (ref 2.5–4.5)
PLATELET # BLD AUTO: 458 K/UL — HIGH (ref 150–400)
POTASSIUM SERPL-MCNC: 3.9 MMOL/L — SIGNIFICANT CHANGE UP (ref 3.5–5.3)
POTASSIUM SERPL-SCNC: 3.9 MMOL/L — SIGNIFICANT CHANGE UP (ref 3.5–5.3)
PROT SERPL-MCNC: 7 G/DL — SIGNIFICANT CHANGE UP (ref 6–8.3)
RBC # BLD: 4.93 M/UL — SIGNIFICANT CHANGE UP (ref 4.2–5.8)
RBC # FLD: 12.5 % — SIGNIFICANT CHANGE UP (ref 10.3–14.5)
SODIUM SERPL-SCNC: 136 MMOL/L — SIGNIFICANT CHANGE UP (ref 135–145)
VANCOMYCIN TROUGH SERPL-MCNC: 16.8 UG/ML — SIGNIFICANT CHANGE UP (ref 10–20)
WBC # BLD: 8.6 K/UL — SIGNIFICANT CHANGE UP (ref 3.8–10.5)
WBC # FLD AUTO: 8.6 K/UL — SIGNIFICANT CHANGE UP (ref 3.8–10.5)

## 2024-09-04 PROCEDURE — 93306 TTE W/DOPPLER COMPLETE: CPT | Mod: 26

## 2024-09-04 PROCEDURE — 99232 SBSQ HOSP IP/OBS MODERATE 35: CPT

## 2024-09-04 PROCEDURE — 99233 SBSQ HOSP IP/OBS HIGH 50: CPT | Mod: GC

## 2024-09-04 RX ORDER — HYDROMORPHONE HYDROCHLORIDE 2 MG/1
0.5 TABLET ORAL ONCE
Refills: 0 | Status: DISCONTINUED | OUTPATIENT
Start: 2024-09-04 | End: 2024-09-04

## 2024-09-04 RX ORDER — VANCOMYCIN/0.9 % SOD CHLORIDE 1.75G/25
1000 PLASTIC BAG, INJECTION (ML) INTRAVENOUS EVERY 8 HOURS
Refills: 0 | Status: COMPLETED | OUTPATIENT
Start: 2024-09-04 | End: 2024-09-05

## 2024-09-04 RX ORDER — LIDOCAINE HCL 20 MG/ML
20 VIAL (ML) INJECTION ONCE
Refills: 0 | Status: DISCONTINUED | OUTPATIENT
Start: 2024-09-04 | End: 2024-09-12

## 2024-09-04 RX ORDER — HYDROMORPHONE HYDROCHLORIDE 2 MG/1
0.4 TABLET ORAL ONCE
Refills: 0 | Status: DISCONTINUED | OUTPATIENT
Start: 2024-09-04 | End: 2024-09-04

## 2024-09-04 RX ADMIN — ACETAMINOPHEN 650 MILLIGRAM(S): 325 TABLET ORAL at 12:10

## 2024-09-04 RX ADMIN — ENOXAPARIN SODIUM 40 MILLIGRAM(S): 100 INJECTION SUBCUTANEOUS at 12:10

## 2024-09-04 RX ADMIN — HYDROMORPHONE HYDROCHLORIDE 0.5 MILLIGRAM(S): 2 TABLET ORAL at 09:34

## 2024-09-04 RX ADMIN — Medication 1 TABLET(S): at 12:10

## 2024-09-04 RX ADMIN — Medication 250 MILLIGRAM(S): at 14:07

## 2024-09-04 RX ADMIN — HYDROMORPHONE HYDROCHLORIDE 0.5 MILLIGRAM(S): 2 TABLET ORAL at 09:04

## 2024-09-04 RX ADMIN — NIFEDIPINE 30 MILLIGRAM(S): 60 TABLET, FILM COATED, EXTENDED RELEASE ORAL at 12:21

## 2024-09-04 RX ADMIN — HYDROMORPHONE HYDROCHLORIDE 0.4 MILLIGRAM(S): 2 TABLET ORAL at 09:56

## 2024-09-04 RX ADMIN — AMPICILLIN SODIUM AND SULBACTAM SODIUM 200 GRAM(S): 1; .5 INJECTION, POWDER, FOR SOLUTION INTRAMUSCULAR; INTRAVENOUS at 16:00

## 2024-09-04 RX ADMIN — Medication 250 MILLIGRAM(S): at 06:26

## 2024-09-04 RX ADMIN — AMPICILLIN SODIUM AND SULBACTAM SODIUM 200 GRAM(S): 1; .5 INJECTION, POWDER, FOR SOLUTION INTRAMUSCULAR; INTRAVENOUS at 12:10

## 2024-09-04 RX ADMIN — Medication 250 MILLIGRAM(S): at 22:02

## 2024-09-04 RX ADMIN — ACETAMINOPHEN 650 MILLIGRAM(S): 325 TABLET ORAL at 00:24

## 2024-09-04 RX ADMIN — ACETAMINOPHEN 650 MILLIGRAM(S): 325 TABLET ORAL at 16:30

## 2024-09-04 RX ADMIN — ACETAMINOPHEN 650 MILLIGRAM(S): 325 TABLET ORAL at 16:00

## 2024-09-04 RX ADMIN — AMPICILLIN SODIUM AND SULBACTAM SODIUM 200 GRAM(S): 1; .5 INJECTION, POWDER, FOR SOLUTION INTRAMUSCULAR; INTRAVENOUS at 05:35

## 2024-09-04 NOTE — PROGRESS NOTE ADULT - PROBLEM SELECTOR PLAN 1
Presentation: Pt meets criteria for sepsis (SIRS 2/4 (WBC>12, HR>90), with SBP drop > 40 2/2 sepsis  Source: R foot cellulitis) s/p clindamycin in the ED. Recently d/c from [facility] on Cephalexin 500mg  Currently WWP, mentating well, and making urine.    9/1: CT Foot:  Ill-defined moderate volume abscess involving right second and third distal intermetatarsal bursae and fourth MTP joint in the proper clinical setting. No tracking emphysema.  ESR: 68 | .6   9/2 X-ray Foot: Radiodense debris seen adjacent to the fifth MTP joint with chronic appearing erosive change of the fifth metatarsal head and deformity of the proximal phalanx.  9/2 MRI Foot: Hyperacute osteomyelitis/septic arthritis at right third and fourth   toe MTP joints. Peripherally enhancing 2.4 x 3.8 x 3.8 cm deep intramuscular abscess.  UA: Negative       Plan:  -Continue Vancomycin 1g q12h (15mg/kg)  -Started on Unasyn 3g Q6hr (9/2-  )  - Bone biopsy planned with podiatry today  -F/u Blood cultures  -F/u Wound cultures  -F/u Urine cx  -Podiatry consulted and recs appreciated  -ID consulted and recs appreciated Pt meets criteria for sepsis (SIRS 2/4 (WBC>12, HR>90), with SBP drop > 40 2/2 sepsis  Source: R foot cellulitis) s/p clindamycin in the ED. Recently d/c from [facility] on Cephalexin 500mg  Currently WWP, mentating well, and making urine.    9/1: CT Foot:  Ill-defined moderate volume abscess involving right second and third distal intermetatarsal bursae and fourth MTP joint in the proper clinical setting. No tracking emphysema.  ESR: 68 | .6   9/2 X-ray Foot: Radiodense debris seen adjacent to the fifth MTP joint with chronic appearing erosive change of the fifth metatarsal head and deformity of the proximal phalanx.  9/2 MRI Foot: Hyperacute osteomyelitis/septic arthritis at right third and fourth   toe MTP joints. Peripherally enhancing 2.4 x 3.8 x 3.8 cm deep intramuscular abscess.  UA: Negative   Bcx: NGTD  Wound culture: numerous s. aureus  Plan:  Continue vanc 1gq12h (trough due 12pm today)  Continue unasyn 3g q6h   f/u blood culture, wound culture  Bone biopsy planned today with podiatry - pt refused 2/2 to pain   Podiatry consulted, appreciate recs  ID consulted, appreciate recs

## 2024-09-04 NOTE — PROGRESS NOTE ADULT - ATTENDING COMMENTS
48 yo M with a PMH of HTN, lower extremity ulcers, presented with R foot pain and found to have ulcers on the foot, now with podiatry following s/p I&D. Originally planned for bone biopsy but patient refusing due to pain despite being offered pain medication/premedication.     VS reviewed and stable     Exam:   Appears comfortable on room air   MMM  Normal WOB on RA, CTAB   RRR, no mrg   Abdomen soft, nontender, nondistended  Extremities warm and without edema, R foot bandage clean, dry, intact   AOX3, no focal neuro deficits     Labs reviewed    Imaging reviewed, concerning for MRI foot showing intramuscular abscess in R foot along with 3rd and 4th metatarsal possible OM.     Plan:   -Continue vancomycin (troughs being monitored)/unasyn for R foot abscess and OM   -Discuss best plan for outpatient treatment of foot abscess with ID given patient is declining bone biopsy. Given wound culture growing staph aureus and purulence, will need MRSA coverage.   -Appreciate podiatry involvement, patient declined having purulence expressed and bone biopsy, understands the risks associated with refusing the procedure and can state back the intent and importance of the procedure   -follow up blood cultures, wound cultures   -Can continue low dose nifedipine for blood pressure    Rest as per resident note.

## 2024-09-04 NOTE — DIETITIAN INITIAL EVALUATION ADULT - PROBLEM SELECTOR PLAN 4
- Not on any home meds, p/w /104 s/p lopressor 50x1,  Hydral 50x1 in ED  EKG: NSR, HR-96, QTc-454  Plan:  - Get Baseline TTE since pt has uncontrolled HTN and not on meds, concern for borderline LVH on EKG (still not meeting criteria for LVH R wave V5+S wave V1<35MM)  - Start nifedipine xl 30mg qd (h/o cocaine abuse, avoid BB)

## 2024-09-04 NOTE — DIETITIAN INITIAL EVALUATION ADULT - ADD RECOMMEND
1. Recommend to liberalize diet to regular to allow for more menu options. Recommend to add Ensure plus high protein 2x/day  2. Encourage pt to meet nutritional needs as able   3. Monitor labs: electrolytes, cmp  4. Monitor weights   5. Pain and bowel regimen per team   6. Will continue to assess/honor food preferences as able  7. Monitor adherence to diet education   *communicated with team

## 2024-09-04 NOTE — PROGRESS NOTE ADULT - SUBJECTIVE AND OBJECTIVE BOX
INTERVAL/OVERNIGHT EVENTS: None    SUBJECTIVE:  Patient seen and examined at bedside, comfortable, NAD. He is feeling well overall. Denies pain to R foot. Denied fever, chest pain, dyspnea, abdominal pain.     Vital Signs Last 12 Hrs  T(F): 97.9 (09-04-24 @ 06:00), Max: 98.4 (09-03-24 @ 21:05)  HR: 74 (09-04-24 @ 06:00) (74 - 88)  BP: 155/99 (09-04-24 @ 06:00) (135/85 - 155/99)  BP(mean): --  RR: 17 (09-04-24 @ 06:00) (17 - 17)  SpO2: 97% (09-04-24 @ 06:00) (96% - 97%)  I&O's Summary      PHYSICAL EXAM:  General: NAD  HEENT: PERRL, EOM intact, sclera anicteric, MMM  Cardiovascular: RRR; no MRG;   Respiratory: CTAB; no WRR  GI/: soft; NTND; BS x4  Extremities: WWP; RLE wrapped  Skin: normal color & turgor; no rash  Neurologic: aox3; no focal deficits    LABS:                        13.7   10.38 )-----------( 370      ( 03 Sep 2024 05:30 )             41.6     09-03    140  |  103  |  12  ----------------------------<  102<H>  3.7   |  25  |  0.81    Ca    8.7      03 Sep 2024 05:30  Phos  3.8     09-03  Mg     2.2     09-03    TPro  6.5  /  Alb  3.0<L>  /  TBili  0.2  /  DBili  x   /  AST  18  /  ALT  16  /  AlkPhos  61  09-03      Urinalysis Basic - ( 03 Sep 2024 05:30 )    Color: x / Appearance: x / SG: x / pH: x  Gluc: 102 mg/dL / Ketone: x  / Bili: x / Urobili: x   Blood: x / Protein: x / Nitrite: x   Leuk Esterase: x / RBC: x / WBC x   Sq Epi: x / Non Sq Epi: x / Bacteria: x          RADIOLOGY & ADDITIONAL TESTS:    MEDICATIONS  (STANDING):  acetaminophen     Tablet .. 650 milliGRAM(s) Oral every 6 hours  ampicillin/sulbactam  IVPB 3 Gram(s) IV Intermittent every 6 hours  enoxaparin Injectable 40 milliGRAM(s) SubCutaneous every 24 hours  HYDROmorphone  Injectable 0.5 milliGRAM(s) IV Push once  lidocaine   4% Patch 1 Patch Transdermal daily  lidocaine 1% Injectable 20 milliLiter(s) Local Injection once  multivitamin 1 Tablet(s) Oral daily  naloxone Injectable 0.4 milliGRAM(s) IV Push once  NIFEdipine XL 30 milliGRAM(s) Oral daily  polyethylene glycol 3350 17 Gram(s) Oral daily  senna 2 Tablet(s) Oral at bedtime    MEDICATIONS  (PRN):  aluminum hydroxide/magnesium hydroxide/simethicone Suspension 30 milliLiter(s) Oral every 4 hours PRN Dyspepsia  bisacodyl 5 milliGRAM(s) Oral daily PRN Constipation  ibuprofen  Tablet. 400 milliGRAM(s) Oral every 6 hours PRN Moderate Pain (4 - 6)  melatonin 3 milliGRAM(s) Oral at bedtime PRN Insomnia  ondansetron Injectable 4 milliGRAM(s) IV Push every 8 hours PRN Nausea and/or Vomiting

## 2024-09-04 NOTE — DIETITIAN INITIAL EVALUATION ADULT - PROBLEM SELECTOR PLAN 1
Presentation: Pt meets criteria for sepsis (SIRS 2/4 (WBC>12, HR>90), Source: R foot cellulitis) s/p clindamycin in the ED. Recently d/c from [facility] on Cephalexin 500mg  Currently WWP, mentating well, and making urine.    9/1: CT Foot:  Ill-defined moderate volume abscess involving right second and third distal intermetatarsal bursae and fourth MTP joint in the proper clinical setting. No tracking emphysema.  -start Vancomycin 1g q12h (15mg/kg)  - check vanc trough before 4th dose  - f/u blood cultures  - f/u urine cx  - consulted podiatry  - Get SW on board  - f/u X ray foot  -f/u ESR, CRP

## 2024-09-04 NOTE — DIETITIAN NUTRITION RISK NOTIFICATION - ADDITIONAL COMMENTS/DIETITIAN RECOMMENDATIONS
Based on ASPEN guidelines, patient meets criteria for severe malnutrition due to moderate muscle wasting and inadequate PO intake.

## 2024-09-04 NOTE — DIETITIAN INITIAL EVALUATION ADULT - PERTINENT LABORATORY DATA
09-04    136  |  100  |  9   ----------------------------<  109<H>  3.9   |  27  |  0.85    Ca    8.9      04 Sep 2024 08:32  Phos  4.3     09-04  Mg     2.0     09-04    TPro  7.0  /  Alb  3.2<L>  /  TBili  <0.2  /  DBili  x   /  AST  25  /  ALT  21  /  AlkPhos  66  09-04  A1C with Estimated Average Glucose Result: 6.0 % (09-02-24 @ 05:30)

## 2024-09-04 NOTE — DIETITIAN INITIAL EVALUATION ADULT - PROBLEM SELECTOR PLAN 2
Presentation: R foot pain 2/2 cellulitis  CT Foot:  Ill-defined moderate volume abscess involving right second and third distal intermetatarsal bursae and fourth MTP joint in the proper clinical setting. No tracking emphysema.  - plan as above  - Pain management: Tylenol 650mg for mild, toradol 15mg Q6H IV PRN for moderate pain, oxy 2.5 q4h PRN for severe pain

## 2024-09-04 NOTE — DIETITIAN INITIAL EVALUATION ADULT - PROBLEM SELECTOR PLAN 3
Patient denies prior admisson for EtOh abuse, seizures or h/o intubations,   Last drink 2 days ago, Drank 1/3 bottle of tequila   CIWA- 1 (at time of assessment)  - f/u blood alcohol  - CIWA checks  - If CIWA>8, Ativan 1mg PRN every 15 mins  - Thiamine 100mg PO for 3 days.   - Folic acid 1mg IVP qd.  - MVI  - Regular diet  - Monitor electrolytes, replete PRN

## 2024-09-04 NOTE — DIETITIAN INITIAL EVALUATION ADULT - OTHER CALCULATIONS
Patient within % IBW (92%) thus actual body weight used for all calculations. Needs adjusted for age and malnutrition.

## 2024-09-04 NOTE — PROGRESS NOTE ADULT - SUBJECTIVE AND OBJECTIVE BOX
Patient is a 49y old  Male who presents with a chief complaint of Sepsis 2/2 R foot cellulitis (04 Sep 2024 08:13)      INTERVAL HPI/ OVERNIGHT EVENTS: Pt evaluated this morning. Resting comfortably. Pain controlled overnight. WBC downtrending to 8.60.       LABS                        14.3   8.60  )-----------( 458      ( 04 Sep 2024 08:32 )             44.3     09-04    136  |  100  |  9   ----------------------------<  109<H>  3.9   |  27  |  0.85    Ca    8.9      04 Sep 2024 08:32  Phos  4.3     09-04  Mg     2.0     09-04    TPro  7.0  /  Alb  3.2<L>  /  TBili  <0.2  /  DBili  x   /  AST  25  /  ALT  21  /  AlkPhos  66  09-04        ICU Vital Signs Last 24 Hrs  T(C): 36.6 (04 Sep 2024 06:00), Max: 36.9 (03 Sep 2024 21:05)  T(F): 97.9 (04 Sep 2024 06:00), Max: 98.4 (03 Sep 2024 21:05)  HR: 74 (04 Sep 2024 06:00) (74 - 93)  BP: 155/99 (04 Sep 2024 06:00) (117/77 - 155/99)  BP(mean): --  ABP: --  ABP(mean): --  RR: 17 (04 Sep 2024 06:00) (16 - 17)  SpO2: 97% (04 Sep 2024 06:00) (96% - 99%)    O2 Parameters below as of 04 Sep 2024 06:00  Patient On (Oxygen Delivery Method): room air            RADIOLOGY  < from: MR Foot w/wo IV Cont, Right (09.02.24 @ 17:20) >  IMPRESSION:    1.  Peripherally enhancing 2.4 x 3.8 x 3.8 cm deep intramuscular abscess   along the dorsolateral margin of right forefoot overlying and   communicating with the third and fourth toe MTP joints.  2.  Hyperacute osteomyelitis/septic arthritis at right third and fourth   toe MTP joints.    --- End of Report ---    < end of copied text >    < from: CT Foot w/ Pt IV Cont, Right (09.01.24 @ 07:09) >    IMPRESSION:    1.  Ill-defined moderate volume abscess involving right second and third   distal intermetatarsal bursae and fourth MTP joint in the proper clinical   setting. No tracking emphysema.  2.  Consider follow-up MRI as clinically indicated.    --- End of Report ---    < end of copied text >    < from: Xray Foot AP + Lateral, Right (09.02.24 @ 08:47) >  IMPRESSION: Radiodense debris seen adjacent to the fifth MTP joint with   chronic appearing erosive change of the fifth metatarsal head and   deformity of the proximal phalanx. Correlation with prior injury is   recommended.    There is diffuse dorsal soft tissue swelling.    --- End of Report ---    < end of copied text >      MICROBIOLOGY      PHYSICAL EXAM  Lower Extremity Focused  Vasc: DP, PT 2/4. TG warm to warm. Edema to R dorsal foot.   Derm: R dorsal foot wound at 3rd mtpj. Sanguinous drainage. 2-3 ccs of purulence expressed. Negative fluctuance. Surrounding erythema. Right sub met 2 hyperkeratotic skin.   Neuro: Protective sensation intact.  MSK: +TTP to Right dorsal foot

## 2024-09-04 NOTE — PROGRESS NOTE ADULT - PROBLEM SELECTOR PLAN 3
9/2 MRI Foot: Hyperacute osteomyelitis/septic arthritis at right third and fourth   toe MTP joints. Peripherally enhancing 2.4 x 3.8 x 3.8 cm deep intramuscular abscess.  s/p I/D x1 (9/2) by podiatry     Plan:  -As above

## 2024-09-04 NOTE — PROGRESS NOTE ADULT - SUBJECTIVE AND OBJECTIVE BOX
INFECTIOUS DISEASES CONSULT FOLLOW-UP NOTE    INTERVAL HPI/OVERNIGHT EVENTS:    Patient seen and examined at bedside. STELLA. Afebrile. Bone biopsy attempted by podiatry this morning however patient could not tolerate due to pain.       ROS:   Constitutional, eyes, ENT, cardiovascular, respiratory, gastrointestinal, genitourinary, integumentary, neurological, psychiatric and heme/lymph are otherwise negative other than noted above       ANTIBIOTICS/RELEVANT:    MEDICATIONS  (STANDING):  acetaminophen     Tablet .. 650 milliGRAM(s) Oral every 6 hours  ampicillin/sulbactam  IVPB 3 Gram(s) IV Intermittent every 6 hours  enoxaparin Injectable 40 milliGRAM(s) SubCutaneous every 24 hours  lidocaine   4% Patch 1 Patch Transdermal daily  lidocaine 1% Injectable 20 milliLiter(s) Local Injection once  multivitamin 1 Tablet(s) Oral daily  naloxone Injectable 0.4 milliGRAM(s) IV Push once  NIFEdipine XL 30 milliGRAM(s) Oral daily  polyethylene glycol 3350 17 Gram(s) Oral daily  senna 2 Tablet(s) Oral at bedtime    MEDICATIONS  (PRN):  aluminum hydroxide/magnesium hydroxide/simethicone Suspension 30 milliLiter(s) Oral every 4 hours PRN Dyspepsia  bisacodyl 5 milliGRAM(s) Oral daily PRN Constipation  ibuprofen  Tablet. 400 milliGRAM(s) Oral every 6 hours PRN Moderate Pain (4 - 6)  melatonin 3 milliGRAM(s) Oral at bedtime PRN Insomnia  ondansetron Injectable 4 milliGRAM(s) IV Push every 8 hours PRN Nausea and/or Vomiting        Vital Signs Last 24 Hrs  T(C): 36.6 (04 Sep 2024 06:00), Max: 36.9 (03 Sep 2024 21:05)  T(F): 97.9 (04 Sep 2024 06:00), Max: 98.4 (03 Sep 2024 21:05)  HR: 74 (04 Sep 2024 06:00) (74 - 93)  BP: 155/99 (04 Sep 2024 06:00) (117/77 - 155/99)  BP(mean): --  RR: 17 (04 Sep 2024 06:00) (16 - 17)  SpO2: 97% (04 Sep 2024 06:00) (96% - 99%)    Parameters below as of 04 Sep 2024 06:00  Patient On (Oxygen Delivery Method): room air        PHYSICAL EXAM:  Constitutional: alert, NAD  Eyes: the sclera and conjunctiva were normal.   ENT: the ears and nose were normal in appearance.   Neck: the appearance of the neck was normal and the neck was supple.   Pulmonary: no respiratory distress and lungs were clear to auscultation bilaterally.   Heart: heart rate was normal and rhythm regular, normal S1 and S2  Vascular:. there was no peripheral edema  Abdomen: normal bowel sounds, soft, non-tender  Neurological: no focal deficits.   Psychiatric: the affect was normal  R foot wrapped - dressing c/d/i.      LABS:                        14.3   8.60  )-----------( 458      ( 04 Sep 2024 08:32 )             44.3     09-04    136  |  100  |  9   ----------------------------<  109<H>  3.9   |  27  |  0.85    Ca    8.9      04 Sep 2024 08:32  Phos  4.3     09-04  Mg     2.0     09-04    TPro  7.0  /  Alb  3.2<L>  /  TBili  <0.2  /  DBili  x   /  AST  25  /  ALT  21  /  AlkPhos  66  09-04      Urinalysis Basic - ( 04 Sep 2024 08:32 )    Color: x / Appearance: x / SG: x / pH: x  Gluc: 109 mg/dL / Ketone: x  / Bili: x / Urobili: x   Blood: x / Protein: x / Nitrite: x   Leuk Esterase: x / RBC: x / WBC x   Sq Epi: x / Non Sq Epi: x / Bacteria: x        MICROBIOLOGY:    Culture - Wound Aerobic/Anaerobic (collected 09-02-24 @ 13:44)  Source: Swab Rt. foot Wound  Preliminary Report (09-03-24 @ 20:19):    Numerous Staphylococcus aureus    Urinalysis with Rflx Culture (collected 09-02-24 @ 12:56)    Culture - Blood (collected 09-01-24 @ 20:16)  Source: .Blood Blood  Preliminary Report (09-03-24 @ 19:02):    No growth at 24 hours    Culture - Blood (collected 09-01-24 @ 20:16)  Source: .Blood Blood  Preliminary Report (09-03-24 @ 19:02):    No growth at 24 hours        RADIOLOGY & ADDITIONAL STUDIES:  Reviewed

## 2024-09-04 NOTE — DIETITIAN INITIAL EVALUATION ADULT - PROBLEM SELECTOR PLAN 5
Smokes cocaine and crack, last smoked 1 month ago  -f/u [facility], blood drug screen  - f/u HIV screen (high risk factors like unprotected sex w/ mult partners)  - consider STI screening outpatient
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
## 2024-09-04 NOTE — DIETITIAN INITIAL EVALUATION ADULT - PERTINENT MEDS FT
MEDICATIONS  (STANDING):  acetaminophen     Tablet .. 650 milliGRAM(s) Oral every 6 hours  ampicillin/sulbactam  IVPB 3 Gram(s) IV Intermittent every 6 hours  enoxaparin Injectable 40 milliGRAM(s) SubCutaneous every 24 hours  lidocaine   4% Patch 1 Patch Transdermal daily  lidocaine 1% Injectable 20 milliLiter(s) Local Injection once  multivitamin 1 Tablet(s) Oral daily  naloxone Injectable 0.4 milliGRAM(s) IV Push once  NIFEdipine XL 30 milliGRAM(s) Oral daily  polyethylene glycol 3350 17 Gram(s) Oral daily  senna 2 Tablet(s) Oral at bedtime  vancomycin  IVPB 1000 milliGRAM(s) IV Intermittent every 8 hours    MEDICATIONS  (PRN):  aluminum hydroxide/magnesium hydroxide/simethicone Suspension 30 milliLiter(s) Oral every 4 hours PRN Dyspepsia  bisacodyl 5 milliGRAM(s) Oral daily PRN Constipation  ibuprofen  Tablet. 400 milliGRAM(s) Oral every 6 hours PRN Moderate Pain (4 - 6)  melatonin 3 milliGRAM(s) Oral at bedtime PRN Insomnia  ondansetron Injectable 4 milliGRAM(s) IV Push every 8 hours PRN Nausea and/or Vomiting
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
## 2024-09-04 NOTE — DIETITIAN NUTRITION RISK NOTIFICATION - TREATMENT: THE FOLLOWING DIET HAS BEEN RECOMMENDED
1. Recommend to liberalize diet to regular to allow for more menu options. Recommend to add Ensure plus high protein 2x/day  2. Encourage pt to meet nutritional needs as able   3. Monitor labs: electrolytes, cmp  4. Monitor weights   5. Pain and bowel regimen per team   6. Will continue to assess/honor food preferences as able  7. Monitor adherence to diet education

## 2024-09-05 LAB
ANION GAP SERPL CALC-SCNC: 7 MMOL/L — SIGNIFICANT CHANGE UP (ref 5–17)
BASOPHILS # BLD AUTO: 0.09 K/UL — SIGNIFICANT CHANGE UP (ref 0–0.2)
BASOPHILS NFR BLD AUTO: 1.3 % — SIGNIFICANT CHANGE UP (ref 0–2)
BUN SERPL-MCNC: 11 MG/DL — SIGNIFICANT CHANGE UP (ref 7–23)
CALCIUM SERPL-MCNC: 9.1 MG/DL — SIGNIFICANT CHANGE UP (ref 8.4–10.5)
CHLORIDE SERPL-SCNC: 101 MMOL/L — SIGNIFICANT CHANGE UP (ref 96–108)
CO2 SERPL-SCNC: 30 MMOL/L — SIGNIFICANT CHANGE UP (ref 22–31)
CREAT SERPL-MCNC: 0.92 MG/DL — SIGNIFICANT CHANGE UP (ref 0.5–1.3)
EGFR: 101 ML/MIN/1.73M2 — SIGNIFICANT CHANGE UP
EOSINOPHIL # BLD AUTO: 0.3 K/UL — SIGNIFICANT CHANGE UP (ref 0–0.5)
EOSINOPHIL NFR BLD AUTO: 4.2 % — SIGNIFICANT CHANGE UP (ref 0–6)
GLUCOSE SERPL-MCNC: 109 MG/DL — HIGH (ref 70–99)
HCT VFR BLD CALC: 42.9 % — SIGNIFICANT CHANGE UP (ref 39–50)
HGB BLD-MCNC: 13.6 G/DL — SIGNIFICANT CHANGE UP (ref 13–17)
IMM GRANULOCYTES NFR BLD AUTO: 0.8 % — SIGNIFICANT CHANGE UP (ref 0–0.9)
LYMPHOCYTES # BLD AUTO: 1.59 K/UL — SIGNIFICANT CHANGE UP (ref 1–3.3)
LYMPHOCYTES # BLD AUTO: 22.2 % — SIGNIFICANT CHANGE UP (ref 13–44)
MAGNESIUM SERPL-MCNC: 2.1 MG/DL — SIGNIFICANT CHANGE UP (ref 1.6–2.6)
MCHC RBC-ENTMCNC: 28.2 PG — SIGNIFICANT CHANGE UP (ref 27–34)
MCHC RBC-ENTMCNC: 31.7 GM/DL — LOW (ref 32–36)
MCV RBC AUTO: 88.8 FL — SIGNIFICANT CHANGE UP (ref 80–100)
MONOCYTES # BLD AUTO: 0.8 K/UL — SIGNIFICANT CHANGE UP (ref 0–0.9)
MONOCYTES NFR BLD AUTO: 11.2 % — SIGNIFICANT CHANGE UP (ref 2–14)
NEUTROPHILS # BLD AUTO: 4.32 K/UL — SIGNIFICANT CHANGE UP (ref 1.8–7.4)
NEUTROPHILS NFR BLD AUTO: 60.3 % — SIGNIFICANT CHANGE UP (ref 43–77)
NRBC # BLD: 0 /100 WBCS — SIGNIFICANT CHANGE UP (ref 0–0)
PHOSPHATE SERPL-MCNC: 3.6 MG/DL — SIGNIFICANT CHANGE UP (ref 2.5–4.5)
PLATELET # BLD AUTO: 536 K/UL — HIGH (ref 150–400)
POTASSIUM SERPL-MCNC: 4.2 MMOL/L — SIGNIFICANT CHANGE UP (ref 3.5–5.3)
POTASSIUM SERPL-SCNC: 4.2 MMOL/L — SIGNIFICANT CHANGE UP (ref 3.5–5.3)
RBC # BLD: 4.83 M/UL — SIGNIFICANT CHANGE UP (ref 4.2–5.8)
RBC # FLD: 12.4 % — SIGNIFICANT CHANGE UP (ref 10.3–14.5)
SODIUM SERPL-SCNC: 138 MMOL/L — SIGNIFICANT CHANGE UP (ref 135–145)
VANCOMYCIN TROUGH SERPL-MCNC: 14.6 UG/ML — SIGNIFICANT CHANGE UP (ref 10–20)
WBC # BLD: 7.16 K/UL — SIGNIFICANT CHANGE UP (ref 3.8–10.5)
WBC # FLD AUTO: 7.16 K/UL — SIGNIFICANT CHANGE UP (ref 3.8–10.5)

## 2024-09-05 PROCEDURE — 99233 SBSQ HOSP IP/OBS HIGH 50: CPT | Mod: GC

## 2024-09-05 PROCEDURE — 99232 SBSQ HOSP IP/OBS MODERATE 35: CPT

## 2024-09-05 RX ORDER — HYDROMORPHONE HYDROCHLORIDE 2 MG/1
0.5 TABLET ORAL ONCE
Refills: 0 | Status: DISCONTINUED | OUTPATIENT
Start: 2024-09-05 | End: 2024-09-05

## 2024-09-05 RX ORDER — VANCOMYCIN/0.9 % SOD CHLORIDE 1.75G/25
1000 PLASTIC BAG, INJECTION (ML) INTRAVENOUS EVERY 8 HOURS
Refills: 0 | Status: COMPLETED | OUTPATIENT
Start: 2024-09-05 | End: 2024-09-06

## 2024-09-05 RX ADMIN — HYDROMORPHONE HYDROCHLORIDE 0.5 MILLIGRAM(S): 2 TABLET ORAL at 08:25

## 2024-09-05 RX ADMIN — ACETAMINOPHEN 650 MILLIGRAM(S): 325 TABLET ORAL at 06:34

## 2024-09-05 RX ADMIN — HYDROMORPHONE HYDROCHLORIDE 0.5 MILLIGRAM(S): 2 TABLET ORAL at 08:55

## 2024-09-05 RX ADMIN — ACETAMINOPHEN 650 MILLIGRAM(S): 325 TABLET ORAL at 13:37

## 2024-09-05 RX ADMIN — Medication 250 MILLIGRAM(S): at 05:50

## 2024-09-05 RX ADMIN — Medication 1 TABLET(S): at 13:07

## 2024-09-05 RX ADMIN — ACETAMINOPHEN 650 MILLIGRAM(S): 325 TABLET ORAL at 05:51

## 2024-09-05 RX ADMIN — ACETAMINOPHEN 650 MILLIGRAM(S): 325 TABLET ORAL at 19:38

## 2024-09-05 RX ADMIN — ENOXAPARIN SODIUM 40 MILLIGRAM(S): 100 INJECTION SUBCUTANEOUS at 13:07

## 2024-09-05 RX ADMIN — ACETAMINOPHEN 650 MILLIGRAM(S): 325 TABLET ORAL at 13:07

## 2024-09-05 RX ADMIN — NIFEDIPINE 30 MILLIGRAM(S): 60 TABLET, FILM COATED, EXTENDED RELEASE ORAL at 10:15

## 2024-09-05 RX ADMIN — ACETAMINOPHEN 650 MILLIGRAM(S): 325 TABLET ORAL at 19:08

## 2024-09-05 RX ADMIN — Medication 250 MILLIGRAM(S): at 19:08
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
## 2024-09-05 NOTE — PROGRESS NOTE ADULT - SUBJECTIVE AND OBJECTIVE BOX
INTERVAL/OVERNIGHT EVENTS: None    SUBJECTIVE: Patient seen and examined at bedside, comfortable, NAD. No pain to the R foot. Eating and drinking well. Denied fever, chest pain, dyspnea, abdominal pain.     Vital Signs Last 12 Hrs  T(F): 98.4 (09-05-24 @ 06:14), Max: 98.4 (09-05-24 @ 06:14)  HR: 88 (09-05-24 @ 06:14) (88 - 88)  BP: 159/98 (09-05-24 @ 06:14) (159/98 - 159/98)  BP(mean): --  RR: 18 (09-05-24 @ 06:14) (18 - 18)  SpO2: 99% (09-05-24 @ 06:14) (99% - 99%)  I&O's Summary      PHYSICAL EXAM:  General: NAD  HEENT: PERRL, EOM intact, sclera anicteric, MMM  Cardiovascular: RRR; no MRG;   Respiratory: CTAB; no WRR  GI/: soft; NTND; BS x4  Extremities: WWP; 2+ peripheral pulses bilaterally; no LE edema; RLE wrapped  Skin: normal color & turgor; no rash  Neurologic: aox3; no focal deficits    LABS:                        13.6   7.16  )-----------( 536      ( 05 Sep 2024 07:20 )             42.9     09-05    138  |  101  |  11  ----------------------------<  109<H>  4.2   |  30  |  0.92    Ca    9.1      05 Sep 2024 07:20  Phos  3.6     09-05  Mg     2.1     09-05    TPro  7.0  /  Alb  3.2<L>  /  TBili  <0.2  /  DBili  x   /  AST  25  /  ALT  21  /  AlkPhos  66  09-04      Urinalysis Basic - ( 05 Sep 2024 07:20 )    Color: x / Appearance: x / SG: x / pH: x  Gluc: 109 mg/dL / Ketone: x  / Bili: x / Urobili: x   Blood: x / Protein: x / Nitrite: x   Leuk Esterase: x / RBC: x / WBC x   Sq Epi: x / Non Sq Epi: x / Bacteria: x      RADIOLOGY & ADDITIONAL TESTS:    MEDICATIONS  (STANDING):  acetaminophen     Tablet .. 650 milliGRAM(s) Oral every 6 hours  enoxaparin Injectable 40 milliGRAM(s) SubCutaneous every 24 hours  lidocaine   4% Patch 1 Patch Transdermal daily  lidocaine 1% Injectable 20 milliLiter(s) Local Injection once  multivitamin 1 Tablet(s) Oral daily  naloxone Injectable 0.4 milliGRAM(s) IV Push once  NIFEdipine XL 30 milliGRAM(s) Oral daily  polyethylene glycol 3350 17 Gram(s) Oral daily  senna 2 Tablet(s) Oral at bedtime    MEDICATIONS  (PRN):  aluminum hydroxide/magnesium hydroxide/simethicone Suspension 30 milliLiter(s) Oral every 4 hours PRN Dyspepsia  bisacodyl 5 milliGRAM(s) Oral daily PRN Constipation  ibuprofen  Tablet. 400 milliGRAM(s) Oral every 6 hours PRN Moderate Pain (4 - 6)  melatonin 3 milliGRAM(s) Oral at bedtime PRN Insomnia  ondansetron Injectable 4 milliGRAM(s) IV Push every 8 hours PRN Nausea and/or Vomiting

## 2024-09-05 NOTE — PROGRESS NOTE ADULT - SUBJECTIVE AND OBJECTIVE BOX
INFECTIOUS DISEASES CONSULT FOLLOW-UP NOTE    INTERVAL HPI/OVERNIGHT EVENTS:    Patient seen and examined at bedside. STELLA. afebrile. States pain in R foot improving.     ROS:   Constitutional, eyes, ENT, cardiovascular, respiratory, gastrointestinal, genitourinary, integumentary, neurological, psychiatric and heme/lymph are otherwise negative other than noted above       ANTIBIOTICS/RELEVANT:    MEDICATIONS  (STANDING):  acetaminophen     Tablet .. 650 milliGRAM(s) Oral every 6 hours  enoxaparin Injectable 40 milliGRAM(s) SubCutaneous every 24 hours  lidocaine   4% Patch 1 Patch Transdermal daily  lidocaine 1% Injectable 20 milliLiter(s) Local Injection once  multivitamin 1 Tablet(s) Oral daily  naloxone Injectable 0.4 milliGRAM(s) IV Push once  NIFEdipine XL 30 milliGRAM(s) Oral daily  polyethylene glycol 3350 17 Gram(s) Oral daily  senna 2 Tablet(s) Oral at bedtime  vancomycin  IVPB 1000 milliGRAM(s) IV Intermittent every 8 hours    MEDICATIONS  (PRN):  aluminum hydroxide/magnesium hydroxide/simethicone Suspension 30 milliLiter(s) Oral every 4 hours PRN Dyspepsia  bisacodyl 5 milliGRAM(s) Oral daily PRN Constipation  ibuprofen  Tablet. 400 milliGRAM(s) Oral every 6 hours PRN Moderate Pain (4 - 6)  melatonin 3 milliGRAM(s) Oral at bedtime PRN Insomnia  ondansetron Injectable 4 milliGRAM(s) IV Push every 8 hours PRN Nausea and/or Vomiting        Vital Signs Last 24 Hrs  T(C): 36.7 (05 Sep 2024 10:10), Max: 37.2 (04 Sep 2024 20:55)  T(F): 98.1 (05 Sep 2024 10:10), Max: 98.9 (04 Sep 2024 20:55)  HR: 84 (05 Sep 2024 10:10) (84 - 97)  BP: 174/92 (05 Sep 2024 10:10) (120/86 - 174/92)  BP(mean): --  RR: 18 (05 Sep 2024 10:10) (17 - 18)  SpO2: 99% (05 Sep 2024 10:10) (97% - 99%)    Parameters below as of 05 Sep 2024 10:10  Patient On (Oxygen Delivery Method): room air        PHYSICAL EXAM:  Constitutional: alert, NAD  Eyes: the sclera and conjunctiva were normal.   ENT: the ears and nose were normal in appearance.   Neck: the appearance of the neck was normal and the neck was supple.   Pulmonary: no respiratory distress and lungs were clear to auscultation bilaterally.   Heart: heart rate was normal and rhythm regular, normal S1 and S2  Vascular:. there was no peripheral edema  Abdomen: normal bowel sounds, soft, non-tender  Neurological: no focal deficits.   Psychiatric: the affect was normal  R foot wrapped - dressing c/d/i.          LABS:                        13.6   7.16  )-----------( 536      ( 05 Sep 2024 07:20 )             42.9     09-05    138  |  101  |  11  ----------------------------<  109<H>  4.2   |  30  |  0.92    Ca    9.1      05 Sep 2024 07:20  Phos  3.6     09-05  Mg     2.1     09-05    TPro  7.0  /  Alb  3.2<L>  /  TBili  <0.2  /  DBili  x   /  AST  25  /  ALT  21  /  AlkPhos  66  09-04      Urinalysis Basic - ( 05 Sep 2024 07:20 )    Color: x / Appearance: x / SG: x / pH: x  Gluc: 109 mg/dL / Ketone: x  / Bili: x / Urobili: x   Blood: x / Protein: x / Nitrite: x   Leuk Esterase: x / RBC: x / WBC x   Sq Epi: x / Non Sq Epi: x / Bacteria: x        MICROBIOLOGY:    Culture - Wound Aerobic/Anaerobic (collected 09-02-24 @ 13:44)  Source: Swab Rt. foot Wound  Preliminary Report (09-04-24 @ 17:24):    Numerous Methicillin Resistant Staphylococcus aureus  Organism: Methicillin resistant Staphylococcus aureus (09-04-24 @ 17:23)  Organism: Methicillin resistant Staphylococcus aureus (09-04-24 @ 17:23)      -  Clindamycin: S 0.5      -  Oxacillin: R >2      -  Gentamicin: S <=1 Should not be used as monotherapy      -  Linezolid: S 2      -  Vancomycin: S 1      -  Tetracycline: R >8      Method Type: KEILY      -  Penicillin: R >8      -  Rifampin: S <=1 Should not be used as monotherapy      -  Erythromycin: R >4      -  Trimethoprim/Sulfamethoxazole: S <=0.5/9.5    Urinalysis with Rflx Culture (collected 09-02-24 @ 12:56)    Culture - Blood (collected 09-01-24 @ 20:16)  Source: .Blood Blood  Preliminary Report (09-04-24 @ 19:01):    No growth at 48 Hours    Culture - Blood (collected 09-01-24 @ 20:16)  Source: .Blood Blood  Preliminary Report (09-04-24 @ 19:01):    No growth at 48 Hours        RADIOLOGY & ADDITIONAL STUDIES:  Reviewed

## 2024-09-05 NOTE — PROGRESS NOTE ADULT - PROBLEM SELECTOR PLAN 1
Pt meets criteria for sepsis (SIRS 2/4 (WBC>12, HR>90), with SBP drop > 40 2/2 sepsis  Source: R foot cellulitis) s/p clindamycin in the ED. Recently d/c from [facility] on Cephalexin 500mg  Currently WWP, mentating well, and making urine.    9/1: CT Foot:  Ill-defined moderate volume abscess involving right second and third distal intermetatarsal bursae and fourth MTP joint in the proper clinical setting. No tracking emphysema.  ESR: 68 | .6   9/2 X-ray Foot: Radiodense debris seen adjacent to the fifth MTP joint with chronic appearing erosive change of the fifth metatarsal head and deformity of the proximal phalanx.  9/2 MRI Foot: Hyperacute osteomyelitis/septic arthritis at right third and fourth   toe MTP joints. Peripherally enhancing 2.4 x 3.8 x 3.8 cm deep intramuscular abscess.  UA: Negative   Bcx: NGTD  Wound culture: MRSA  d/hugh unasyn 09/4  Plan:  Continue vanc 1g q12h (trough due 12pm today)  f/u blood culture, wound culture  Podiatry consulted, appreciate recs  ID consulted, appreciate recs

## 2024-09-05 NOTE — PROGRESS NOTE ADULT - SUBJECTIVE AND OBJECTIVE BOX
Patient is a 49y old  Male who presents with a chief complaint of Sepsis 2/2 R foot cellulitis (05 Sep 2024 07:29)      INTERVAL HPI/ OVERNIGHT EVENTS. Pt evaluated this morning. Resting comfortably. Pain controlled overnight.       LABS                        13.6   7.16  )-----------( 536      ( 05 Sep 2024 07:20 )             42.9     09-05    138  |  101  |  11  ----------------------------<  109<H>  4.2   |  30  |  0.92    Ca    9.1      05 Sep 2024 07:20  Phos  3.6     09-05  Mg     2.1     09-05    TPro  7.0  /  Alb  3.2<L>  /  TBili  <0.2  /  DBili  x   /  AST  25  /  ALT  21  /  AlkPhos  66  09-04        ICU Vital Signs Last 24 Hrs  T(C): 36.9 (05 Sep 2024 06:14), Max: 37.2 (04 Sep 2024 20:55)  T(F): 98.4 (05 Sep 2024 06:14), Max: 98.9 (04 Sep 2024 20:55)  HR: 88 (05 Sep 2024 06:14) (75 - 97)  BP: 159/98 (05 Sep 2024 06:14) (120/86 - 165/99)  BP(mean): --  ABP: --  ABP(mean): --  RR: 18 (05 Sep 2024 06:14) (17 - 18)  SpO2: 99% (05 Sep 2024 06:14) (97% - 99%)    O2 Parameters below as of 05 Sep 2024 06:14  Patient On (Oxygen Delivery Method): room air            RADIOLOGY  < from: MR Foot w/wo IV Cont, Right (09.02.24 @ 17:20) >  IMPRESSION:    1.  Peripherally enhancing 2.4 x 3.8 x 3.8 cm deep intramuscular abscess   along the dorsolateral margin of right forefoot overlying and   communicating with the third and fourth toe MTP joints.  2.  Hyperacute osteomyelitis/septic arthritis at right third and fourth   toe MTP joints.    --- End of Report ---    < end of copied text >    < from: CT Foot w/ Pt IV Cont, Right (09.01.24 @ 07:09) >    IMPRESSION:    1.  Ill-defined moderate volume abscess involving right second and third   distal intermetatarsal bursae and fourth MTP joint in the proper clinical   setting. No tracking emphysema.  2.  Consider follow-up MRI as clinically indicated.    --- End of Report ---    < end of copied text >    < from: Xray Foot AP + Lateral, Right (09.02.24 @ 08:47) >  IMPRESSION: Radiodense debris seen adjacent to the fifth MTP joint with   chronic appearing erosive change of the fifth metatarsal head and   deformity of the proximal phalanx. Correlation with prior injury is   recommended.    There is diffuse dorsal soft tissue swelling.    --- End of Report ---    < end of copied text >      MICROBIOLOGY  < from: MR Foot w/wo IV Cont, Right (09.02.24 @ 17:20) >  IMPRESSION:    1.  Peripherally enhancing 2.4 x 3.8 x 3.8 cm deep intramuscular abscess   along the dorsolateral margin of right forefoot overlying and   communicating with the third and fourth toe MTP joints.  2.  Hyperacute osteomyelitis/septic arthritis at right third and fourth   toe MTP joints.    --- End of Report ---    < end of copied text >      PHYSICAL EXAM  Lower Extremity Focused  Vasc: DP, PT 2/4. TG warm to warm. Edema to R dorsal foot.   Derm: R dorsal foot wound at 3rd mtpj. Sanguinous drainage. Negative fluctuance. Surrounding erythema. Right sub met 2 hyperkeratotic skin.   Neuro: Protective sensation intact.  MSK: +TTP to Right dorsal foot
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
## 2024-09-05 NOTE — PROGRESS NOTE ADULT - PROBLEM SELECTOR PLAN 5
Not on any home meds, p/w /104 s/p lopressor 50x1,  Hydral 50x1 in ED  EKG: NSR, HR-96, QTc-454  TTE (09/04) with LVH, mild aortic regurgitation, small pericardial effusion    Plan:  -C/w Nifedipine xl 30mg QD  - f/u cards outpatient for TTE findings
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
## 2024-09-05 NOTE — PROGRESS NOTE ADULT - PROBLEM SELECTOR PLAN 6
Smokes cocaine and crack, last smoked 1 month ago  Urine: THC +, Cocaine +  HIV: Non Reactive     Plan:  -Consider STI screening outpatient

## 2024-09-06 LAB
ANION GAP SERPL CALC-SCNC: 9 MMOL/L — SIGNIFICANT CHANGE UP (ref 5–17)
BASOPHILS # BLD AUTO: 0.08 K/UL — SIGNIFICANT CHANGE UP (ref 0–0.2)
BASOPHILS NFR BLD AUTO: 0.9 % — SIGNIFICANT CHANGE UP (ref 0–2)
BLD GP AB SCN SERPL QL: NEGATIVE — SIGNIFICANT CHANGE UP
BUN SERPL-MCNC: 13 MG/DL — SIGNIFICANT CHANGE UP (ref 7–23)
CALCIUM SERPL-MCNC: 8.8 MG/DL — SIGNIFICANT CHANGE UP (ref 8.4–10.5)
CHLORIDE SERPL-SCNC: 103 MMOL/L — SIGNIFICANT CHANGE UP (ref 96–108)
CO2 SERPL-SCNC: 25 MMOL/L — SIGNIFICANT CHANGE UP (ref 22–31)
CREAT SERPL-MCNC: 0.82 MG/DL — SIGNIFICANT CHANGE UP (ref 0.5–1.3)
EGFR: 106 ML/MIN/1.73M2 — SIGNIFICANT CHANGE UP
EOSINOPHIL # BLD AUTO: 0.16 K/UL — SIGNIFICANT CHANGE UP (ref 0–0.5)
EOSINOPHIL NFR BLD AUTO: 1.7 % — SIGNIFICANT CHANGE UP (ref 0–6)
GIANT PLATELETS BLD QL SMEAR: PRESENT — SIGNIFICANT CHANGE UP
GLUCOSE SERPL-MCNC: 101 MG/DL — HIGH (ref 70–99)
HCT VFR BLD CALC: 41.9 % — SIGNIFICANT CHANGE UP (ref 39–50)
HGB BLD-MCNC: 13.4 G/DL — SIGNIFICANT CHANGE UP (ref 13–17)
LYMPHOCYTES # BLD AUTO: 2.19 K/UL — SIGNIFICANT CHANGE UP (ref 1–3.3)
LYMPHOCYTES # BLD AUTO: 23.5 % — SIGNIFICANT CHANGE UP (ref 13–44)
MAGNESIUM SERPL-MCNC: 2.2 MG/DL — SIGNIFICANT CHANGE UP (ref 1.6–2.6)
MANUAL SMEAR VERIFICATION: SIGNIFICANT CHANGE UP
MCHC RBC-ENTMCNC: 28.3 PG — SIGNIFICANT CHANGE UP (ref 27–34)
MCHC RBC-ENTMCNC: 32 GM/DL — SIGNIFICANT CHANGE UP (ref 32–36)
MCV RBC AUTO: 88.4 FL — SIGNIFICANT CHANGE UP (ref 80–100)
MONOCYTES # BLD AUTO: 1.21 K/UL — HIGH (ref 0–0.9)
MONOCYTES NFR BLD AUTO: 13 % — SIGNIFICANT CHANGE UP (ref 2–14)
NEUTROPHILS # BLD AUTO: 5.66 K/UL — SIGNIFICANT CHANGE UP (ref 1.8–7.4)
NEUTROPHILS NFR BLD AUTO: 60.9 % — SIGNIFICANT CHANGE UP (ref 43–77)
PHOSPHATE SERPL-MCNC: 3.8 MG/DL — SIGNIFICANT CHANGE UP (ref 2.5–4.5)
PLAT MORPH BLD: ABNORMAL
PLATELET # BLD AUTO: 522 K/UL — HIGH (ref 150–400)
POTASSIUM SERPL-MCNC: 4.1 MMOL/L — SIGNIFICANT CHANGE UP (ref 3.5–5.3)
POTASSIUM SERPL-SCNC: 4.1 MMOL/L — SIGNIFICANT CHANGE UP (ref 3.5–5.3)
RBC # BLD: 4.74 M/UL — SIGNIFICANT CHANGE UP (ref 4.2–5.8)
RBC # FLD: 12.4 % — SIGNIFICANT CHANGE UP (ref 10.3–14.5)
RBC BLD AUTO: NORMAL — SIGNIFICANT CHANGE UP
RH IG SCN BLD-IMP: POSITIVE — SIGNIFICANT CHANGE UP
SODIUM SERPL-SCNC: 137 MMOL/L — SIGNIFICANT CHANGE UP (ref 135–145)
WBC # BLD: 9.3 K/UL — SIGNIFICANT CHANGE UP (ref 3.8–10.5)
WBC # FLD AUTO: 9.3 K/UL — SIGNIFICANT CHANGE UP (ref 3.8–10.5)

## 2024-09-06 PROCEDURE — 99232 SBSQ HOSP IP/OBS MODERATE 35: CPT

## 2024-09-06 PROCEDURE — 99233 SBSQ HOSP IP/OBS HIGH 50: CPT | Mod: GC

## 2024-09-06 RX ORDER — VANCOMYCIN/0.9 % SOD CHLORIDE 1.75G/25
1000 PLASTIC BAG, INJECTION (ML) INTRAVENOUS EVERY 8 HOURS
Refills: 0 | Status: COMPLETED | OUTPATIENT
Start: 2024-09-06 | End: 2024-09-07

## 2024-09-06 RX ADMIN — ACETAMINOPHEN 650 MILLIGRAM(S): 325 TABLET ORAL at 19:01

## 2024-09-06 RX ADMIN — Medication 250 MILLIGRAM(S): at 13:07

## 2024-09-06 RX ADMIN — Medication 1 TABLET(S): at 13:07

## 2024-09-06 RX ADMIN — ENOXAPARIN SODIUM 40 MILLIGRAM(S): 100 INJECTION SUBCUTANEOUS at 13:07

## 2024-09-06 RX ADMIN — ACETAMINOPHEN 650 MILLIGRAM(S): 325 TABLET ORAL at 07:30

## 2024-09-06 RX ADMIN — ACETAMINOPHEN 650 MILLIGRAM(S): 325 TABLET ORAL at 23:52

## 2024-09-06 RX ADMIN — Medication 250 MILLIGRAM(S): at 22:19

## 2024-09-06 RX ADMIN — NIFEDIPINE 30 MILLIGRAM(S): 60 TABLET, FILM COATED, EXTENDED RELEASE ORAL at 10:28

## 2024-09-06 RX ADMIN — ACETAMINOPHEN 650 MILLIGRAM(S): 325 TABLET ORAL at 06:32

## 2024-09-06 RX ADMIN — ACETAMINOPHEN 650 MILLIGRAM(S): 325 TABLET ORAL at 13:06

## 2024-09-06 RX ADMIN — ACETAMINOPHEN 650 MILLIGRAM(S): 325 TABLET ORAL at 01:15

## 2024-09-06 RX ADMIN — ACETAMINOPHEN 650 MILLIGRAM(S): 325 TABLET ORAL at 00:15

## 2024-09-06 RX ADMIN — Medication 250 MILLIGRAM(S): at 03:31

## 2024-09-06 RX ADMIN — ACETAMINOPHEN 650 MILLIGRAM(S): 325 TABLET ORAL at 19:31

## 2024-09-06 RX ADMIN — ACETAMINOPHEN 650 MILLIGRAM(S): 325 TABLET ORAL at 13:36

## 2024-09-06 NOTE — PROGRESS NOTE ADULT - PROBLEM SELECTOR PLAN 1
Pt meets criteria for sepsis (SIRS 2/4 (WBC>12, HR>90), with SBP drop > 40 2/2 sepsis  Source: R foot cellulitis) s/p clindamycin in the ED. Recently d/c from [facility] on Cephalexin 500mg  Currently WWP, mentating well, and making urine.    9/1: CT Foot:  Ill-defined moderate volume abscess involving right second and third distal intermetatarsal bursae and fourth MTP joint in the proper clinical setting. No tracking emphysema.  ESR: 68 | .6   9/2 X-ray Foot: Radiodense debris seen adjacent to the fifth MTP joint with chronic appearing erosive change of the fifth metatarsal head and deformity of the proximal phalanx.  9/2 MRI Foot: Hyperacute osteomyelitis/septic arthritis at right third and fourth   toe MTP joints. Peripherally enhancing 2.4 x 3.8 x 3.8 cm deep intramuscular abscess.  UA: Negative   Bcx: NGTD  Wound culture: MRSA  d/hugh unasyn 09/4  Plan:  Continue vanc 1g q12h (trough due 3pm today)  f/u blood culture, wound culture  Podiatry consulted, appreciate recs  ID consulted, appreciate recs Pt meets criteria for sepsis (SIRS 2/4 (WBC>12, HR>90), with SBP drop > 40 2/2 sepsis  Source: R foot cellulitis) s/p clindamycin in the ED. Recently d/c from [facility] on Cephalexin 500mg  Currently WWP, mentating well, and making urine.    9/1: CT Foot:  Ill-defined moderate volume abscess involving right second and third distal intermetatarsal bursae and fourth MTP joint in the proper clinical setting. No tracking emphysema.  ESR: 68 | .6   9/2 X-ray Foot: Radiodense debris seen adjacent to the fifth MTP joint with chronic appearing erosive change of the fifth metatarsal head and deformity of the proximal phalanx.  9/2 MRI Foot: Hyperacute osteomyelitis/septic arthritis at right third and fourth   toe MTP joints. Peripherally enhancing 2.4 x 3.8 x 3.8 cm deep intramuscular abscess.  UA: Negative   Bcx: NGTD  Wound culture: MRSA  d/hugh unasyn 09/4  Plan:  Continue vanc 1g q12h (trough due 4pm today)  f/u blood culture, wound culture  Podiatry consulted, appreciate recs  ID consulted, appreciate recs

## 2024-09-06 NOTE — PROGRESS NOTE ADULT - SUBJECTIVE AND OBJECTIVE BOX
INTERVAL/OVERNIGHT EVENTS: None    SUBJECTIVE:  Patient seen and examined at bedside, comfortable, NAD. Has mild pulsating pain in R foot, improved with tylenol. Denied fever, chest pain, dyspnea, abdominal pain.     Vital Signs Last 12 Hrs  T(F): 98.3 (09-06-24 @ 05:47), Max: 98.3 (09-06-24 @ 05:47)  HR: 87 (09-06-24 @ 05:47) (87 - 87)  BP: 146/93 (09-06-24 @ 05:47) (146/93 - 146/93)  BP(mean): --  RR: 18 (09-06-24 @ 05:47) (18 - 18)  SpO2: 97% (09-06-24 @ 05:47) (97% - 97%)  I&O's Summary    05 Sep 2024 07:01  -  06 Sep 2024 07:00  --------------------------------------------------------  IN: 250 mL / OUT: 0 mL / NET: 250 mL    PHYSICAL EXAM:  General: NAD  HEENT: PERRL, EOM intact, sclera anicteric, MMM  Cardiovascular: RRR; no MRG;   Respiratory: CTAB; no WRR  GI/: soft; NTND; BS x4  Extremities: WWP; 2+ peripheral pulses bilaterally; no LE edema; R foot wrapped  Skin: normal color & turgor; no rash  Neurologic: aox3; no focal deficits    LABS:                        13.4   9.30  )-----------( 522      ( 06 Sep 2024 06:39 )             41.9     09-06    137  |  103  |  13  ----------------------------<  101<H>  4.1   |  25  |  0.82    Ca    8.8      06 Sep 2024 06:39  Phos  3.8     09-06  Mg     2.2     09-06        Urinalysis Basic - ( 06 Sep 2024 06:39 )    Color: x / Appearance: x / SG: x / pH: x  Gluc: 101 mg/dL / Ketone: x  / Bili: x / Urobili: x   Blood: x / Protein: x / Nitrite: x   Leuk Esterase: x / RBC: x / WBC x   Sq Epi: x / Non Sq Epi: x / Bacteria: x      RADIOLOGY & ADDITIONAL TESTS:    MEDICATIONS  (STANDING):  acetaminophen     Tablet .. 650 milliGRAM(s) Oral every 6 hours  enoxaparin Injectable 40 milliGRAM(s) SubCutaneous every 24 hours  lidocaine   4% Patch 1 Patch Transdermal daily  lidocaine 1% Injectable 20 milliLiter(s) Local Injection once  multivitamin 1 Tablet(s) Oral daily  naloxone Injectable 0.4 milliGRAM(s) IV Push once  NIFEdipine XL 30 milliGRAM(s) Oral daily  polyethylene glycol 3350 17 Gram(s) Oral daily  senna 2 Tablet(s) Oral at bedtime  vancomycin  IVPB 1000 milliGRAM(s) IV Intermittent every 8 hours    MEDICATIONS  (PRN):  aluminum hydroxide/magnesium hydroxide/simethicone Suspension 30 milliLiter(s) Oral every 4 hours PRN Dyspepsia  bisacodyl 5 milliGRAM(s) Oral daily PRN Constipation  ibuprofen  Tablet. 400 milliGRAM(s) Oral every 6 hours PRN Moderate Pain (4 - 6)  melatonin 3 milliGRAM(s) Oral at bedtime PRN Insomnia  ondansetron Injectable 4 milliGRAM(s) IV Push every 8 hours PRN Nausea and/or Vomiting
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
## 2024-09-06 NOTE — PROGRESS NOTE ADULT - SUBJECTIVE AND OBJECTIVE BOX
INFECTIOUS DISEASES CONSULT FOLLOW-UP NOTE    INTERVAL HPI/OVERNIGHT EVENTS:    Patient seen and examined at bedside. STELLA. Afebrile. Pain in R foot well controlled       ROS:   Constitutional, eyes, ENT, cardiovascular, respiratory, gastrointestinal, genitourinary, integumentary, neurological, psychiatric and heme/lymph are otherwise negative other than noted above       ANTIBIOTICS/RELEVANT:    MEDICATIONS  (STANDING):  acetaminophen     Tablet .. 650 milliGRAM(s) Oral every 6 hours  enoxaparin Injectable 40 milliGRAM(s) SubCutaneous every 24 hours  lidocaine   4% Patch 1 Patch Transdermal daily  lidocaine 1% Injectable 20 milliLiter(s) Local Injection once  multivitamin 1 Tablet(s) Oral daily  naloxone Injectable 0.4 milliGRAM(s) IV Push once  NIFEdipine XL 30 milliGRAM(s) Oral daily  polyethylene glycol 3350 17 Gram(s) Oral daily  senna 2 Tablet(s) Oral at bedtime    MEDICATIONS  (PRN):  aluminum hydroxide/magnesium hydroxide/simethicone Suspension 30 milliLiter(s) Oral every 4 hours PRN Dyspepsia  bisacodyl 5 milliGRAM(s) Oral daily PRN Constipation  ibuprofen  Tablet. 400 milliGRAM(s) Oral every 6 hours PRN Moderate Pain (4 - 6)  melatonin 3 milliGRAM(s) Oral at bedtime PRN Insomnia  ondansetron Injectable 4 milliGRAM(s) IV Push every 8 hours PRN Nausea and/or Vomiting        Vital Signs Last 24 Hrs  T(C): 36.8 (06 Sep 2024 10:26), Max: 37 (05 Sep 2024 21:52)  T(F): 98.2 (06 Sep 2024 10:26), Max: 98.6 (05 Sep 2024 21:52)  HR: 92 (06 Sep 2024 10:26) (87 - 92)  BP: 142/94 (06 Sep 2024 10:26) (142/94 - 147/99)  BP(mean): 110 (06 Sep 2024 10:26) (110 - 115)  RR: 18 (06 Sep 2024 10:26) (18 - 18)  SpO2: 97% (06 Sep 2024 10:26) (97% - 98%)    Parameters below as of 06 Sep 2024 10:26  Patient On (Oxygen Delivery Method): room air        09-05-24 @ 07:01  -  09-06-24 @ 07:00  --------------------------------------------------------  IN: 250 mL / OUT: 0 mL / NET: 250 mL      PHYSICAL EXAM:  Constitutional: alert, NAD  Eyes: the sclera and conjunctiva were normal.   ENT: the ears and nose were normal in appearance.   Neck: the appearance of the neck was normal and the neck was supple.   Pulmonary: no respiratory distress and lungs were clear to auscultation bilaterally.   Heart: heart rate was normal and rhythm regular, normal S1 and S2  Vascular:. there was no peripheral edema  Abdomen: normal bowel sounds, soft, non-tender  Neurological: no focal deficits.   Psychiatric: the affect was normal  R foot wrapped - dressing c/d/i.        LABS:                        13.4   9.30  )-----------( 522      ( 06 Sep 2024 06:39 )             41.9     09-06    137  |  103  |  13  ----------------------------<  101<H>  4.1   |  25  |  0.82    Ca    8.8      06 Sep 2024 06:39  Phos  3.8     09-06  Mg     2.2     09-06        Urinalysis Basic - ( 06 Sep 2024 06:39 )    Color: x / Appearance: x / SG: x / pH: x  Gluc: 101 mg/dL / Ketone: x  / Bili: x / Urobili: x   Blood: x / Protein: x / Nitrite: x   Leuk Esterase: x / RBC: x / WBC x   Sq Epi: x / Non Sq Epi: x / Bacteria: x        MICROBIOLOGY:    Culture - Wound Aerobic/Anaerobic (collected 09-02-24 @ 13:44)  Source: Swab Rt. foot Wound  Preliminary Report (09-04-24 @ 17:24):    Numerous Methicillin Resistant Staphylococcus aureus  Organism: Methicillin resistant Staphylococcus aureus (09-04-24 @ 17:23)  Organism: Methicillin resistant Staphylococcus aureus (09-04-24 @ 17:23)      -  Clindamycin: S 0.5      -  Oxacillin: R >2      -  Gentamicin: S <=1 Should not be used as monotherapy      -  Linezolid: S 2      -  Vancomycin: S 1      -  Tetracycline: R >8      Method Type: KEILY      -  Penicillin: R >8      -  Rifampin: S <=1 Should not be used as monotherapy      -  Erythromycin: R >4      -  Trimethoprim/Sulfamethoxazole: S <=0.5/9.5    Urinalysis with Rflx Culture (collected 09-02-24 @ 12:56)    Culture - Blood (collected 09-01-24 @ 20:16)  Source: .Blood Blood  Preliminary Report (09-05-24 @ 19:00):    No growth at 72 Hours    Culture - Blood (collected 09-01-24 @ 20:16)  Source: .Blood Blood  Preliminary Report (09-05-24 @ 19:00):    No growth at 72 Hours        RADIOLOGY & ADDITIONAL STUDIES:  Reviewed
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
## 2024-09-06 NOTE — PROGRESS NOTE ADULT - SUBJECTIVE AND OBJECTIVE BOX
Patient is a 49y old  Male who presents with a chief complaint of Sepsis 2/2 R foot cellulitis (06 Sep 2024 07:52)      INTERVAL HPI/ OVERNIGHT EVENTS. Pt evaluated this morning. Resting comfortably. Pain controlled overnight.         LABS                        13.4   9.30  )-----------( 522      ( 06 Sep 2024 06:39 )             41.9     09-06    137  |  103  |  13  ----------------------------<  101<H>  4.1   |  25  |  0.82    Ca    8.8      06 Sep 2024 06:39  Phos  3.8     09-06  Mg     2.2     09-06          ICU Vital Signs Last 24 Hrs  T(C): 36.8 (06 Sep 2024 05:47), Max: 37 (05 Sep 2024 21:52)  T(F): 98.3 (06 Sep 2024 05:47), Max: 98.6 (05 Sep 2024 21:52)  HR: 87 (06 Sep 2024 05:47) (84 - 88)  BP: 146/93 (06 Sep 2024 05:47) (146/93 - 174/92)  BP(mean): 115 (05 Sep 2024 21:52) (115 - 115)  ABP: --  ABP(mean): --  RR: 18 (06 Sep 2024 05:47) (18 - 18)  SpO2: 97% (06 Sep 2024 05:47) (97% - 99%)    O2 Parameters below as of 06 Sep 2024 05:47  Patient On (Oxygen Delivery Method): room air            RADIOLOGY  < from: MR Foot w/wo IV Cont, Right (09.02.24 @ 17:20) >  IMPRESSION:    1.  Peripherally enhancing 2.4 x 3.8 x 3.8 cm deep intramuscular abscess   along the dorsolateral margin of right forefoot overlying and   communicating with the third and fourth toe MTP joints.  2.  Hyperacute osteomyelitis/septic arthritis at right third and fourth   toe MTP joints.    --- End of Report ---    < end of copied text >    < from: CT Foot w/ Pt IV Cont, Right (09.01.24 @ 07:09) >    IMPRESSION:    1.  Ill-defined moderate volume abscess involving right second and third   distal intermetatarsal bursae and fourth MTP joint in the proper clinical   setting. No tracking emphysema.  2.  Consider follow-up MRI as clinically indicated.    --- End of Report ---    < end of copied text >    < from: Xray Foot AP + Lateral, Right (09.02.24 @ 08:47) >  IMPRESSION: Radiodense debris seen adjacent to the fifth MTP joint with   chronic appearing erosive change of the fifth metatarsal head and   deformity of the proximal phalanx. Correlation with prior injury is   recommended.    There is diffuse dorsal soft tissue swelling.    --- End of Report ---    < end of copied text >      MICROBIOLOGY  < from: MR Foot w/wo IV Cont, Right (09.02.24 @ 17:20) >  IMPRESSION:    1.  Peripherally enhancing 2.4 x 3.8 x 3.8 cm deep intramuscular abscess   along the dorsolateral margin of right forefoot overlying and   communicating with the third and fourth toe MTP joints.  2.  Hyperacute osteomyelitis/septic arthritis at right third and fourth   toe MTP joints.    --- End of Report ---    < end of copied text >      PHYSICAL EXAM  Lower Extremity Focused  Vasc: DP, PT 2/4. TG warm to warm. Edema to R dorsal foot.   Derm: R dorsal foot wound at 3rd mtpj. Serosanguinous drainage. Negative fluctuance. Improving erythema. Right sub met 2 hyperkeratotic skin.   Neuro: Protective sensation intact.  MSK: +TTP to Right dorsal foot

## 2024-09-07 LAB
ANION GAP SERPL CALC-SCNC: 8 MMOL/L — SIGNIFICANT CHANGE UP (ref 5–17)
BASOPHILS # BLD AUTO: 0.1 K/UL — SIGNIFICANT CHANGE UP (ref 0–0.2)
BASOPHILS NFR BLD AUTO: 1 % — SIGNIFICANT CHANGE UP (ref 0–2)
BUN SERPL-MCNC: 14 MG/DL — SIGNIFICANT CHANGE UP (ref 7–23)
CALCIUM SERPL-MCNC: 9.1 MG/DL — SIGNIFICANT CHANGE UP (ref 8.4–10.5)
CHLORIDE SERPL-SCNC: 102 MMOL/L — SIGNIFICANT CHANGE UP (ref 96–108)
CO2 SERPL-SCNC: 27 MMOL/L — SIGNIFICANT CHANGE UP (ref 22–31)
CREAT SERPL-MCNC: 0.86 MG/DL — SIGNIFICANT CHANGE UP (ref 0.5–1.3)
CULTURE RESULTS: ABNORMAL
CULTURE RESULTS: SIGNIFICANT CHANGE UP
CULTURE RESULTS: SIGNIFICANT CHANGE UP
EGFR: 105 ML/MIN/1.73M2 — SIGNIFICANT CHANGE UP
EOSINOPHIL # BLD AUTO: 0.24 K/UL — SIGNIFICANT CHANGE UP (ref 0–0.5)
EOSINOPHIL NFR BLD AUTO: 2.4 % — SIGNIFICANT CHANGE UP (ref 0–6)
GLUCOSE SERPL-MCNC: 90 MG/DL — SIGNIFICANT CHANGE UP (ref 70–99)
HCT VFR BLD CALC: 41.3 % — SIGNIFICANT CHANGE UP (ref 39–50)
HGB BLD-MCNC: 13.5 G/DL — SIGNIFICANT CHANGE UP (ref 13–17)
IMM GRANULOCYTES NFR BLD AUTO: 3 % — HIGH (ref 0–0.9)
LYMPHOCYTES # BLD AUTO: 1.5 K/UL — SIGNIFICANT CHANGE UP (ref 1–3.3)
LYMPHOCYTES # BLD AUTO: 14.7 % — SIGNIFICANT CHANGE UP (ref 13–44)
MAGNESIUM SERPL-MCNC: 2.2 MG/DL — SIGNIFICANT CHANGE UP (ref 1.6–2.6)
MCHC RBC-ENTMCNC: 29.4 PG — SIGNIFICANT CHANGE UP (ref 27–34)
MCHC RBC-ENTMCNC: 32.7 GM/DL — SIGNIFICANT CHANGE UP (ref 32–36)
MCV RBC AUTO: 90 FL — SIGNIFICANT CHANGE UP (ref 80–100)
MONOCYTES # BLD AUTO: 0.93 K/UL — HIGH (ref 0–0.9)
MONOCYTES NFR BLD AUTO: 9.1 % — SIGNIFICANT CHANGE UP (ref 2–14)
NEUTROPHILS # BLD AUTO: 7.09 K/UL — SIGNIFICANT CHANGE UP (ref 1.8–7.4)
NEUTROPHILS NFR BLD AUTO: 69.8 % — SIGNIFICANT CHANGE UP (ref 43–77)
NRBC # BLD: 0 /100 WBCS — SIGNIFICANT CHANGE UP (ref 0–0)
ORGANISM # SPEC MICROSCOPIC CNT: ABNORMAL
ORGANISM # SPEC MICROSCOPIC CNT: SIGNIFICANT CHANGE UP
PHOSPHATE SERPL-MCNC: 4.2 MG/DL — SIGNIFICANT CHANGE UP (ref 2.5–4.5)
PLATELET # BLD AUTO: 538 K/UL — HIGH (ref 150–400)
POTASSIUM SERPL-MCNC: 4.3 MMOL/L — SIGNIFICANT CHANGE UP (ref 3.5–5.3)
POTASSIUM SERPL-SCNC: 4.3 MMOL/L — SIGNIFICANT CHANGE UP (ref 3.5–5.3)
RBC # BLD: 4.59 M/UL — SIGNIFICANT CHANGE UP (ref 4.2–5.8)
RBC # FLD: 12.8 % — SIGNIFICANT CHANGE UP (ref 10.3–14.5)
SODIUM SERPL-SCNC: 137 MMOL/L — SIGNIFICANT CHANGE UP (ref 135–145)
SPECIMEN SOURCE: SIGNIFICANT CHANGE UP
VANCOMYCIN TROUGH SERPL-MCNC: 20.2 UG/ML — HIGH (ref 10–20)
WBC # BLD: 10.17 K/UL — SIGNIFICANT CHANGE UP (ref 3.8–10.5)
WBC # FLD AUTO: 10.17 K/UL — SIGNIFICANT CHANGE UP (ref 3.8–10.5)

## 2024-09-07 PROCEDURE — 99233 SBSQ HOSP IP/OBS HIGH 50: CPT

## 2024-09-07 RX ORDER — VANCOMYCIN/0.9 % SOD CHLORIDE 1.75G/25
1000 PLASTIC BAG, INJECTION (ML) INTRAVENOUS EVERY 12 HOURS
Refills: 0 | Status: DISCONTINUED | OUTPATIENT
Start: 2024-09-07 | End: 2024-09-07

## 2024-09-07 RX ORDER — VANCOMYCIN/0.9 % SOD CHLORIDE 1.75G/25
1000 PLASTIC BAG, INJECTION (ML) INTRAVENOUS EVERY 12 HOURS
Refills: 0 | Status: COMPLETED | OUTPATIENT
Start: 2024-09-07 | End: 2024-09-08

## 2024-09-07 RX ADMIN — ACETAMINOPHEN 650 MILLIGRAM(S): 325 TABLET ORAL at 18:21

## 2024-09-07 RX ADMIN — ACETAMINOPHEN 650 MILLIGRAM(S): 325 TABLET ORAL at 05:32

## 2024-09-07 RX ADMIN — Medication 1 TABLET(S): at 11:57

## 2024-09-07 RX ADMIN — Medication 250 MILLIGRAM(S): at 05:32

## 2024-09-07 RX ADMIN — ACETAMINOPHEN 650 MILLIGRAM(S): 325 TABLET ORAL at 11:57

## 2024-09-07 RX ADMIN — ACETAMINOPHEN 650 MILLIGRAM(S): 325 TABLET ORAL at 06:32

## 2024-09-07 RX ADMIN — ACETAMINOPHEN 650 MILLIGRAM(S): 325 TABLET ORAL at 00:52

## 2024-09-07 RX ADMIN — ACETAMINOPHEN 650 MILLIGRAM(S): 325 TABLET ORAL at 17:51

## 2024-09-07 RX ADMIN — NIFEDIPINE 30 MILLIGRAM(S): 60 TABLET, FILM COATED, EXTENDED RELEASE ORAL at 09:41

## 2024-09-07 RX ADMIN — ENOXAPARIN SODIUM 40 MILLIGRAM(S): 100 INJECTION SUBCUTANEOUS at 11:57

## 2024-09-07 RX ADMIN — ACETAMINOPHEN 650 MILLIGRAM(S): 325 TABLET ORAL at 23:50

## 2024-09-07 RX ADMIN — ACETAMINOPHEN 650 MILLIGRAM(S): 325 TABLET ORAL at 12:27

## 2024-09-07 RX ADMIN — Medication 250 MILLIGRAM(S): at 17:51

## 2024-09-07 NOTE — PROGRESS NOTE ADULT - SUBJECTIVE AND OBJECTIVE BOX
Patient is a 49y old  Male who presents with a chief complaint of Sepsis 2/2 R foot cellulitis (06 Sep 2024 16:22)      SUBJECTIVE / OVERNIGHT EVENTS:  No acute events.  Patient reports improvement in fatigue.  Vanco trough today.    MEDICATIONS  (STANDING):  acetaminophen     Tablet .. 650 milliGRAM(s) Oral every 6 hours  enoxaparin Injectable 40 milliGRAM(s) SubCutaneous every 24 hours  lidocaine   4% Patch 1 Patch Transdermal daily  lidocaine 1% Injectable 20 milliLiter(s) Local Injection once  multivitamin 1 Tablet(s) Oral daily  naloxone Injectable 0.4 milliGRAM(s) IV Push once  NIFEdipine XL 30 milliGRAM(s) Oral daily  polyethylene glycol 3350 17 Gram(s) Oral daily  senna 2 Tablet(s) Oral at bedtime    MEDICATIONS  (PRN):  aluminum hydroxide/magnesium hydroxide/simethicone Suspension 30 milliLiter(s) Oral every 4 hours PRN Dyspepsia  bisacodyl 5 milliGRAM(s) Oral daily PRN Constipation  ibuprofen  Tablet. 400 milliGRAM(s) Oral every 6 hours PRN Moderate Pain (4 - 6)  melatonin 3 milliGRAM(s) Oral at bedtime PRN Insomnia  ondansetron Injectable 4 milliGRAM(s) IV Push every 8 hours PRN Nausea and/or Vomiting      CAPILLARY BLOOD GLUCOSE        I&O's Summary      PHYSICAL EXAM:  Vital Signs Last 24 Hrs  T(C): 36.7 (07 Sep 2024 06:03), Max: 36.8 (06 Sep 2024 21:36)  T(F): 98 (07 Sep 2024 06:03), Max: 98.2 (06 Sep 2024 21:36)  HR: 87 (07 Sep 2024 06:03) (87 - 94)  BP: 133/90 (07 Sep 2024 06:03) (133/90 - 153/90)  BP(mean): --  RR: 18 (07 Sep 2024 06:03) (18 - 18)  SpO2: 97% (07 Sep 2024 06:03) (97% - 97%)    Parameters below as of 07 Sep 2024 06:03  Patient On (Oxygen Delivery Method): room air      GENERAL: NAD, well-developed  HEAD:  Atraumatic, Normocephalic  EYES: EOMI, PERRLA, conjunctiva and sclera clear  NECK: Supple, No JVD  CHEST/LUNG: Clear to auscultation bilaterally; No wheeze  HEART: Regular rate and rhythm; No murmurs, rubs, or gallops  ABDOMEN: Soft, Nontender, Nondistended; Bowel sounds present  EXTREMITIES:  2+ Peripheral Pulses, No clubbing, cyanosis, or edema. Right foot is wrapped.  PSYCH: AAOx3  NEUROLOGY: non-focal  SKIN: No rashes or lesions    LABS:                        13.5   10.17 )-----------( 538      ( 07 Sep 2024 05:30 )             41.3     09-07    137  |  102  |  14  ----------------------------<  90  4.3   |  27  |  0.86    Ca    9.1      07 Sep 2024 05:30  Phos  4.2     09-07  Mg     2.2     09-07            Urinalysis Basic - ( 07 Sep 2024 05:30 )    Color: x / Appearance: x / SG: x / pH: x  Gluc: 90 mg/dL / Ketone: x  / Bili: x / Urobili: x   Blood: x / Protein: x / Nitrite: x   Leuk Esterase: x / RBC: x / WBC x   Sq Epi: x / Non Sq Epi: x / Bacteria: x        RADIOLOGY & ADDITIONAL TESTS:    Imaging Personally Reviewed:    Consultant(s) Notes Reviewed:      Care Discussed with Consultants/Other Providers:
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
## 2024-09-07 NOTE — PROGRESS NOTE ADULT - PROBLEM SELECTOR PROBLEM 2
Acute osteomyelitis BMI: BMI (kg/m2): 31.6 (10-11-23 @ 11:12)  HbA1c: A1C with Estimated Average Glucose Result: 11.2 % (10-11-23 @ 11:04)    Glucose: POCT Blood Glucose.: 199 mg/dL (10-22-23 @ 16:13)    BP: 127/77 (10-22-23 @ 16:30) (127/77 - 155/73)  Lipid Panel: Date/Time: 10-10-23 @ 05:30  Cholesterol, Serum: 118  Direct LDL: --  HDL Cholesterol, Serum: 36  Total Cholesterol/HDL Ration Measurement: --  Triglycerides, Serum: 87

## 2024-09-07 NOTE — PROGRESS NOTE ADULT - SUBJECTIVE AND OBJECTIVE BOX
Patient is a 49y old  Male who presents with a chief complaint of Sepsis 2/2 R foot cellulitis (07 Sep 2024 11:29)      INTERVAL HPI/ OVERNIGHT EVENTS  Pt evaluated this morning. Resting comfortably.         LABS                        13.5   10.17 )-----------( 538      ( 07 Sep 2024 05:30 )             41.3     09-07    137  |  102  |  14  ----------------------------<  90  4.3   |  27  |  0.86    Ca    9.1      07 Sep 2024 05:30  Phos  4.2     09-07  Mg     2.2     09-07          ICU Vital Signs Last 24 Hrs  T(C): 36.7 (07 Sep 2024 06:03), Max: 36.8 (06 Sep 2024 21:36)  T(F): 98 (07 Sep 2024 06:03), Max: 98.2 (06 Sep 2024 21:36)  HR: 87 (07 Sep 2024 06:03) (87 - 94)  BP: 133/90 (07 Sep 2024 06:03) (133/90 - 153/90)  BP(mean): --  ABP: --  ABP(mean): --  RR: 18 (07 Sep 2024 06:03) (18 - 18)  SpO2: 97% (07 Sep 2024 06:03) (97% - 97%)    O2 Parameters below as of 07 Sep 2024 06:03  Patient On (Oxygen Delivery Method): room air      RADIOLOGY    < from: MR Foot w/wo IV Cont, Right (09.02.24 @ 17:20) >  IMPRESSION:    1.  Peripherally enhancing 2.4 x 3.8 x 3.8 cm deep intramuscular abscess   along the dorsolateral margin of right forefoot overlying and   communicating with the third and fourth toe MTP joints.  2.  Hyperacute osteomyelitis/septic arthritis at right third and fourth   toe MTP joints.    --- End of Report ---    < end of copied text >    < from: CT Foot w/ Pt IV Cont, Right (09.01.24 @ 07:09) >    IMPRESSION:    1.  Ill-defined moderate volume abscess involving right second and third   distal intermetatarsal bursae and fourth MTP joint in the proper clinical   setting. No tracking emphysema.  2.  Consider follow-up MRI as clinically indicated.    --- End of Report ---    < end of copied text >    < from: Xray Foot AP + Lateral, Right (09.02.24 @ 08:47) >  IMPRESSION: Radiodense debris seen adjacent to the fifth MTP joint with   chronic appearing erosive change of the fifth metatarsal head and   deformity of the proximal phalanx. Correlation with prior injury is   recommended.    There is diffuse dorsal soft tissue swelling.    --- End of Report ---    < end of copied text >    < from: MR Foot w/wo IV Cont, Right (09.02.24 @ 17:20) >  IMPRESSION:    1.  Peripherally enhancing 2.4 x 3.8 x 3.8 cm deep intramuscular abscess   along the dorsolateral margin of right forefoot overlying and   communicating with the third and fourth toe MTP joints.  2.  Hyperacute osteomyelitis/septic arthritis at right third and fourth   toe MTP joints.    --- End of Report ---    < end of copied text >      MICROBIOLOGY  Culture - Wound Aerobic/Anaerobic (09.02.24 @ 13:44)    -  Clindamycin: S 0.5   -  Erythromycin: R >4   -  Gentamicin: S <=1 Should not be used as monotherapy   -  Linezolid: S 2   -  Oxacillin: R >2   -  Penicillin: R >8   -  Rifampin: S <=1 Should not be used as monotherapy   -  Tetracycline: R >8   -  Trimethoprim/Sulfamethoxazole: S <=0.5/9.5   -  Vancomycin: S 1   Specimen Source: Swab Rt. foot Wound   Culture Results:   Numerous Methicillin Resistant Staphylococcus aureus   Organism Identification: Methicillin resistant Staphylococcus aureus   Organism: Methicillin resistant Staphylococcus aureus   Method Type: KEILY          PHYSICAL EXAM  Lower Extremity Focused  Vasc: DP, PT 2/4. TG warm to warm. Edema to R dorsal foot.   Derm: R dorsal foot wound at 3rd mtpj. Serosanguinous drainage. Negative fluctuance. Improving erythema. Right sub met 2 hyperkeratotic skin.   Neuro: Protective sensation intact.  MSK: +TTP to Right dorsal foot

## 2024-09-07 NOTE — PROGRESS NOTE ADULT - PROBLEM SELECTOR PLAN 1
Received notice from pharmacy that 90 day script of venlafaxine was denied d/t requirement that any supply greater than 30 day come from mail order, per insurance policy.    Pt informed and does not want mail order.  Script re-ordered as 30 day supply.   RESOLVED  Pt met criteria for sepsis (SIRS 2/4 (WBC>12, HR>90), with SBP drop > 40 2/2 sepsis  Source: R foot cellulitis) s/p clindamycin in the ED. Recently d/c from [facility] on Cephalexin 500mg  Currently WWP, mentating well, and making urine.    9/1: CT Foot:  Ill-defined moderate volume abscess involving right second and third distal intermetatarsal bursae and fourth MTP joint in the proper clinical setting. No tracking emphysema.  ESR: 68 | .6   9/2 X-ray Foot: Radiodense debris seen adjacent to the fifth MTP joint with chronic appearing erosive change of the fifth metatarsal head and deformity of the proximal phalanx.  9/2 MRI Foot: Hyperacute osteomyelitis/septic arthritis at right third and fourth   toe MTP joints. Peripherally enhancing 2.4 x 3.8 x 3.8 cm deep intramuscular abscess.  UA: Negative   Bcx: NGTD  Wound culture: MRSA  d/hugh unasyn 09/4  Plan:  Continue vanc 1g q12h (trough today)  f/u blood culture, wound culture  Podiatry consulted, appreciate recs  ID consulted, appreciate recs

## 2024-09-08 PROCEDURE — 99232 SBSQ HOSP IP/OBS MODERATE 35: CPT

## 2024-09-08 RX ORDER — VANCOMYCIN/0.9 % SOD CHLORIDE 1.75G/25
1000 PLASTIC BAG, INJECTION (ML) INTRAVENOUS ONCE
Refills: 0 | Status: COMPLETED | OUTPATIENT
Start: 2024-09-08 | End: 2024-09-08

## 2024-09-08 RX ADMIN — NIFEDIPINE 30 MILLIGRAM(S): 60 TABLET, FILM COATED, EXTENDED RELEASE ORAL at 11:06

## 2024-09-08 RX ADMIN — ACETAMINOPHEN 650 MILLIGRAM(S): 325 TABLET ORAL at 23:09

## 2024-09-08 RX ADMIN — ACETAMINOPHEN 650 MILLIGRAM(S): 325 TABLET ORAL at 11:05

## 2024-09-08 RX ADMIN — ACETAMINOPHEN 650 MILLIGRAM(S): 325 TABLET ORAL at 18:01

## 2024-09-08 RX ADMIN — ACETAMINOPHEN 650 MILLIGRAM(S): 325 TABLET ORAL at 18:31

## 2024-09-08 RX ADMIN — Medication 250 MILLIGRAM(S): at 18:01

## 2024-09-08 RX ADMIN — ACETAMINOPHEN 650 MILLIGRAM(S): 325 TABLET ORAL at 00:50

## 2024-09-08 RX ADMIN — Medication 250 MILLIGRAM(S): at 05:30

## 2024-09-08 RX ADMIN — HYDROMORPHONE HYDROCHLORIDE 0.4 MILLIGRAM(S): 2 TABLET ORAL at 10:26

## 2024-09-08 RX ADMIN — ENOXAPARIN SODIUM 40 MILLIGRAM(S): 100 INJECTION SUBCUTANEOUS at 11:05

## 2024-09-08 RX ADMIN — ACETAMINOPHEN 650 MILLIGRAM(S): 325 TABLET ORAL at 12:40

## 2024-09-08 RX ADMIN — Medication 1 TABLET(S): at 11:05

## 2024-09-08 RX ADMIN — Medication 2 TABLET(S): at 21:07

## 2024-09-08 RX ADMIN — ACETAMINOPHEN 650 MILLIGRAM(S): 325 TABLET ORAL at 07:15

## 2024-09-08 RX ADMIN — ACETAMINOPHEN 650 MILLIGRAM(S): 325 TABLET ORAL at 06:15

## 2024-09-08 RX ADMIN — ACETAMINOPHEN 650 MILLIGRAM(S): 325 TABLET ORAL at 11:35

## 2024-09-08 NOTE — PROGRESS NOTE ADULT - ATTENDING COMMENTS
Patient seen and examined with resident.  Agree with note as above.  Meds, labs and vitals all reviewed.  Patient with osteomyelitis, on broad spectrum antibiotics.  Clinically stable.  ID following.

## 2024-09-08 NOTE — PROGRESS NOTE ADULT - SUBJECTIVE AND OBJECTIVE BOX
CC: Patient is a 49y old  Male who presents with a chief complaint of Sepsis 2/2 R foot cellulitis (07 Sep 2024 12:26)      INTERVAL EVENTS: STELLA    SUBJECTIVE / INTERVAL HPI: Patient seen and examined at bedside.     ROS: negative unless otherwise stated above.    VITAL SIGNS:  Vital Signs Last 24 Hrs  T(C): 36.8 (08 Sep 2024 05:52), Max: 36.9 (07 Sep 2024 13:46)  T(F): 98.2 (08 Sep 2024 05:52), Max: 98.5 (07 Sep 2024 13:46)  HR: 90 (08 Sep 2024 05:52) (86 - 90)  BP: 144/95 (08 Sep 2024 05:52) (128/89 - 144/95)  BP(mean): 102 (07 Sep 2024 21:11) (102 - 102)  RR: 18 (08 Sep 2024 05:52) (18 - 18)  SpO2: 98% (08 Sep 2024 05:52) (98% - 98%)    Parameters below as of 08 Sep 2024 05:52  Patient On (Oxygen Delivery Method): room air            PHYSICAL EXAM:    General: NAD  HEENT: MMM  Neck: supple  Cardiovascular: +S1/S2; RRR  Respiratory: CTA B/L; no W/R/R  Gastrointestinal: soft, NT/ND  Extremities: WWP; no edema, clubbing or cyanosis  Vascular: 2+ radial, DP/PT pulses B/L  Neurological: AAOx3; no focal deficits    MEDICATIONS:  MEDICATIONS  (STANDING):  acetaminophen     Tablet .. 650 milliGRAM(s) Oral every 6 hours  enoxaparin Injectable 40 milliGRAM(s) SubCutaneous every 24 hours  lidocaine   4% Patch 1 Patch Transdermal daily  lidocaine 1% Injectable 20 milliLiter(s) Local Injection once  multivitamin 1 Tablet(s) Oral daily  naloxone Injectable 0.4 milliGRAM(s) IV Push once  NIFEdipine XL 30 milliGRAM(s) Oral daily  polyethylene glycol 3350 17 Gram(s) Oral daily  senna 2 Tablet(s) Oral at bedtime    MEDICATIONS  (PRN):  aluminum hydroxide/magnesium hydroxide/simethicone Suspension 30 milliLiter(s) Oral every 4 hours PRN Dyspepsia  bisacodyl 5 milliGRAM(s) Oral daily PRN Constipation  ibuprofen  Tablet. 400 milliGRAM(s) Oral every 6 hours PRN Moderate Pain (4 - 6)  melatonin 3 milliGRAM(s) Oral at bedtime PRN Insomnia  ondansetron Injectable 4 milliGRAM(s) IV Push every 8 hours PRN Nausea and/or Vomiting      ALLERGIES:  Allergies    No Known Allergies    Intolerances        LABS:                        13.5   10.17 )-----------( 538      ( 07 Sep 2024 05:30 )             41.3     09-07    137  |  102  |  14  ----------------------------<  90  4.3   |  27  |  0.86    Ca    9.1      07 Sep 2024 05:30  Phos  4.2     09-07  Mg     2.2     09-07        Urinalysis Basic - ( 07 Sep 2024 05:30 )    Color: x / Appearance: x / SG: x / pH: x  Gluc: 90 mg/dL / Ketone: x  / Bili: x / Urobili: x   Blood: x / Protein: x / Nitrite: x   Leuk Esterase: x / RBC: x / WBC x   Sq Epi: x / Non Sq Epi: x / Bacteria: x      CAPILLARY BLOOD GLUCOSE          RADIOLOGY & ADDITIONAL TESTS: Reviewed. CC: Patient is a 49y old  Male who presents with a chief complaint of Sepsis 2/2 R foot cellulitis     INTERVAL EVENTS: STELLA  SUBJECTIVE / INTERVAL HPI: Patient seen and examined at bedside.   ROS: negative unless otherwise stated above.    VITAL SIGNS:  Vital Signs Last 24 Hrs  T(C): 36.8 (08 Sep 2024 05:52), Max: 36.9 (07 Sep 2024 13:46)  T(F): 98.2 (08 Sep 2024 05:52), Max: 98.5 (07 Sep 2024 13:46)  HR: 90 (08 Sep 2024 05:52) (86 - 90)  BP: 144/95 (08 Sep 2024 05:52) (128/89 - 144/95)  BP(mean): 102 (07 Sep 2024 21:11) (102 - 102)  RR: 18 (08 Sep 2024 05:52) (18 - 18)  SpO2: 98% (08 Sep 2024 05:52) (98% - 98%)    Parameters below as of 08 Sep 2024 05:52  Patient On (Oxygen Delivery Method): room air            PHYSICAL EXAM:    General: NAD  HEENT: MMM  Neck: supple  Cardiovascular: +S1/S2; RRR  Respiratory: CTA B/L; no W/R/R  Gastrointestinal: soft, NT/ND  Extremities: WWP; no edema, clubbing or cyanosis  Vascular: 2+ radial, DP/PT pulses B/L  Neurological: AAOx3; no focal deficits    MEDICATIONS:  MEDICATIONS  (STANDING):  acetaminophen     Tablet .. 650 milliGRAM(s) Oral every 6 hours  enoxaparin Injectable 40 milliGRAM(s) SubCutaneous every 24 hours  lidocaine   4% Patch 1 Patch Transdermal daily  lidocaine 1% Injectable 20 milliLiter(s) Local Injection once  multivitamin 1 Tablet(s) Oral daily  naloxone Injectable 0.4 milliGRAM(s) IV Push once  NIFEdipine XL 30 milliGRAM(s) Oral daily  polyethylene glycol 3350 17 Gram(s) Oral daily  senna 2 Tablet(s) Oral at bedtime    MEDICATIONS  (PRN):  aluminum hydroxide/magnesium hydroxide/simethicone Suspension 30 milliLiter(s) Oral every 4 hours PRN Dyspepsia  bisacodyl 5 milliGRAM(s) Oral daily PRN Constipation  ibuprofen  Tablet. 400 milliGRAM(s) Oral every 6 hours PRN Moderate Pain (4 - 6)  melatonin 3 milliGRAM(s) Oral at bedtime PRN Insomnia  ondansetron Injectable 4 milliGRAM(s) IV Push every 8 hours PRN Nausea and/or Vomiting      ALLERGIES:  Allergies    No Known Allergies    Intolerances        LABS:                        13.5   10.17 )-----------( 538      ( 07 Sep 2024 05:30 )             41.3     09-07    137  |  102  |  14  ----------------------------<  90  4.3   |  27  |  0.86    Ca    9.1      07 Sep 2024 05:30  Phos  4.2     09-07  Mg     2.2     09-07        Urinalysis Basic - ( 07 Sep 2024 05:30 )    Color: x / Appearance: x / SG: x / pH: x  Gluc: 90 mg/dL / Ketone: x  / Bili: x / Urobili: x   Blood: x / Protein: x / Nitrite: x   Leuk Esterase: x / RBC: x / WBC x   Sq Epi: x / Non Sq Epi: x / Bacteria: x      CAPILLARY BLOOD GLUCOSE          RADIOLOGY & ADDITIONAL TESTS: Reviewed. CC: Patient is a 49y old  Male who presents with a chief complaint of Sepsis 2/2 R foot cellulitis     INTERVAL EVENTS: STELLA  SUBJECTIVE / INTERVAL HPI: Patient seen and examined at bedside. Denies pain in R foot. No concerns or complaints today.  ROS: negative unless otherwise stated above.    VITAL SIGNS:  Vital Signs Last 24 Hrs  T(C): 36.8 (08 Sep 2024 05:52), Max: 36.9 (07 Sep 2024 13:46)  T(F): 98.2 (08 Sep 2024 05:52), Max: 98.5 (07 Sep 2024 13:46)  HR: 90 (08 Sep 2024 05:52) (86 - 90)  BP: 144/95 (08 Sep 2024 05:52) (128/89 - 144/95)  BP(mean): 102 (07 Sep 2024 21:11) (102 - 102)  RR: 18 (08 Sep 2024 05:52) (18 - 18)  SpO2: 98% (08 Sep 2024 05:52) (98% - 98%)    Parameters below as of 08 Sep 2024 05:52  Patient On (Oxygen Delivery Method): room air        PHYSICAL EXAM:    General: NAD  HEENT: MMM  Neck: supple  Cardiovascular: +S1/S2; RRR  Respiratory: CTA B/L; no W/R/R  Gastrointestinal: soft, NT/ND  Extremities: WWP; no edema, clubbing or cyanosis, R foot wrapped  Vascular: 2+ radial, DP/PT pulses B/L  Neurological: AAOx3; no focal deficits    MEDICATIONS:  MEDICATIONS  (STANDING):  acetaminophen     Tablet .. 650 milliGRAM(s) Oral every 6 hours  enoxaparin Injectable 40 milliGRAM(s) SubCutaneous every 24 hours  lidocaine   4% Patch 1 Patch Transdermal daily  lidocaine 1% Injectable 20 milliLiter(s) Local Injection once  multivitamin 1 Tablet(s) Oral daily  naloxone Injectable 0.4 milliGRAM(s) IV Push once  NIFEdipine XL 30 milliGRAM(s) Oral daily  polyethylene glycol 3350 17 Gram(s) Oral daily  senna 2 Tablet(s) Oral at bedtime    MEDICATIONS  (PRN):  aluminum hydroxide/magnesium hydroxide/simethicone Suspension 30 milliLiter(s) Oral every 4 hours PRN Dyspepsia  bisacodyl 5 milliGRAM(s) Oral daily PRN Constipation  ibuprofen  Tablet. 400 milliGRAM(s) Oral every 6 hours PRN Moderate Pain (4 - 6)  melatonin 3 milliGRAM(s) Oral at bedtime PRN Insomnia  ondansetron Injectable 4 milliGRAM(s) IV Push every 8 hours PRN Nausea and/or Vomiting      ALLERGIES:  Allergies    No Known Allergies    Intolerances        LABS:                        13.5   10.17 )-----------( 538      ( 07 Sep 2024 05:30 )             41.3     09-07    137  |  102  |  14  ----------------------------<  90  4.3   |  27  |  0.86    Ca    9.1      07 Sep 2024 05:30  Phos  4.2     09-07  Mg     2.2     09-07        Urinalysis Basic - ( 07 Sep 2024 05:30 )    Color: x / Appearance: x / SG: x / pH: x  Gluc: 90 mg/dL / Ketone: x  / Bili: x / Urobili: x   Blood: x / Protein: x / Nitrite: x   Leuk Esterase: x / RBC: x / WBC x   Sq Epi: x / Non Sq Epi: x / Bacteria: x      CAPILLARY BLOOD GLUCOSE          RADIOLOGY & ADDITIONAL TESTS: Reviewed.

## 2024-09-08 NOTE — PROGRESS NOTE ADULT - ASSESSMENT
R dorsal foot abscess and, septic arthritis and OM at R 3rd and 4th toe MTP joints, culture from bedside debridement grew MRSA.  He is doing well clinically.  Vancomycin trough done on 9/7 followed 3 appropriately timed vancomycin doses - was 10.2.  If drawn at noon as labeled, was a little early but too high.  Primary team has decreased dose from q8h to q12h - agree.  Suggest:  - Continue vancomycin 1 g IV q12h - trough prior to morning dose on 9/9  - Plan for 6 week course (9/1-10/12)  Will follow with you, team 2.

## 2024-09-08 NOTE — PROGRESS NOTE ADULT - SUBJECTIVE AND OBJECTIVE BOX
INTERVAL HPI/OVERNIGHT EVENTS:  Afebrile, foot is not bothering him.    CONSTITUTIONAL:  No fever, chills, night sweats  EYES:  No photophobia or visual changes  CARDIOVASCULAR:  No chest pain  RESPIRATORY:  No cough, wheezing, or SOB   GASTROINTESTINAL:  No nausea, vomiting, diarrhea, constipation, or abdominal pain  GENITOURINARY:  No frequency, urgency, dysuria or hematuria  NEUROLOGIC:  No headache, lightheadedness      ANTIBIOTICS/RELEVANT:          Vital Signs Last 24 Hrs  T(C): 36.8 (08 Sep 2024 20:02), Max: 36.8 (08 Sep 2024 05:52)  T(F): 98.2 (08 Sep 2024 20:02), Max: 98.2 (08 Sep 2024 05:52)  HR: 74 (08 Sep 2024 20:02) (74 - 92)  BP: 132/87 (08 Sep 2024 20:02) (128/89 - 154/98)  BP(mean): 102 (07 Sep 2024 21:11) (102 - 102)  RR: 18 (08 Sep 2024 20:02) (18 - 18)  SpO2: 98% (08 Sep 2024 20:02) (97% - 98%)    Parameters below as of 08 Sep 2024 20:02  Patient On (Oxygen Delivery Method): room air        PHYSICAL EXAM:  Constitutional:  Alert  HEENT:  NC, Sclerae anicteric, conjunctivae clear, PERRL.  No nasal exudate or sinus tenderness;  No buccal mucosal lesions, no pharyngeal erythema or exudate	  Neck:  Supple, no adenopathy  Respiratory:  Clear bilaterally  Cardiovascular:  RRR, S1S2, no murmur appreciated  Gastrointestinal:  Symmetric, normoactive BS, soft, NT, no masses, guarding or rebound.  No HSM  Extremities:  No edema, R foot wrapped by Podiatry      LABS:                        13.5   10.17 )-----------( 538      ( 07 Sep 2024 05:30 )             41.3         09-07    137  |  102  |  14  ----------------------------<  90  4.3   |  27  |  0.86    Ca    9.1      07 Sep 2024 05:30  Phos  4.2     09-07  Mg     2.2     09-07        Urinalysis Basic - ( 07 Sep 2024 05:30 )    Color: x / Appearance: x / SG: x / pH: x  Gluc: 90 mg/dL / Ketone: x  / Bili: x / Urobili: x   Blood: x / Protein: x / Nitrite: x   Leuk Esterase: x / RBC: x / WBC x   Sq Epi: x / Non Sq Epi: x / Bacteria: x        MICROBIOLOGY:        RADIOLOGY & ADDITIONAL STUDIES:

## 2024-09-08 NOTE — PROGRESS NOTE ADULT - PROBLEM SELECTOR PLAN 1
Pt meets criteria for sepsis (SIRS 2/4 (WBC>12, HR>90), with SBP drop > 40 2/2 sepsis  Source: R foot cellulitis) s/p clindamycin in the ED. Recently d/c from [facility] on Cephalexin 500mg  Currently WWP, mentating well, and making urine.    9/1: CT Foot:  Ill-defined moderate volume abscess involving right second and third distal intermetatarsal bursae and fourth MTP joint in the proper clinical setting. No tracking emphysema.  ESR: 68 | .6   9/2 X-ray Foot: Radiodense debris seen adjacent to the fifth MTP joint with chronic appearing erosive change of the fifth metatarsal head and deformity of the proximal phalanx.  9/2 MRI Foot: Hyperacute osteomyelitis/septic arthritis at right third and fourth   toe MTP joints. Peripherally enhancing 2.4 x 3.8 x 3.8 cm deep intramuscular abscess.  UA: Negative   Bcx: NGTD  Wound culture: MRSA  d/hugh unasyn 09/4  Plan:  Continue vanc 1g q12h (trough due 4pm today)  f/u blood culture, wound culture  Podiatry consulted, appreciate recs  ID consulted, appreciate recs Pt meets criteria for sepsis (SIRS 2/4 (WBC>12, HR>90), with SBP drop > 40 2/2 sepsis  Source: R foot cellulitis) s/p clindamycin in the ED. Recently d/c from [facility] on Cephalexin 500mg  Currently WWP, mentating well, and making urine.    9/1: CT Foot:  Ill-defined moderate volume abscess involving right second and third distal intermetatarsal bursae and fourth MTP joint in the proper clinical setting. No tracking emphysema.  ESR: 68 | .6   9/2 X-ray Foot: Radiodense debris seen adjacent to the fifth MTP joint with chronic appearing erosive change of the fifth metatarsal head and deformity of the proximal phalanx.  9/2 MRI Foot: Hyperacute osteomyelitis/septic arthritis at right third and fourth   toe MTP joints. Peripherally enhancing 2.4 x 3.8 x 3.8 cm deep intramuscular abscess.  UA: Negative   Bcx: NGTD  Wound culture: MRSA  d/hugh unasyn 09/4  Plan:  Continue vanc 1g q12h   f/u blood culture, wound culture  Podiatry consulted, appreciate recs  ID consulted, appreciate recs Pt meets criteria for sepsis (SIRS 2/4 (WBC>12, HR>90), with SBP drop > 40 2/2 sepsis  Source: R foot cellulitis) s/p clindamycin in the ED. Recently d/c from [facility] on Cephalexin 500mg  Currently WWP, mentating well, and making urine.    9/1: CT Foot:  Ill-defined moderate volume abscess involving right second and third distal intermetatarsal bursae and fourth MTP joint in the proper clinical setting. No tracking emphysema.  ESR: 68 | .6   9/2 X-ray Foot: Radiodense debris seen adjacent to the fifth MTP joint with chronic appearing erosive change of the fifth metatarsal head and deformity of the proximal phalanx.  9/2 MRI Foot: Hyperacute osteomyelitis/septic arthritis at right third and fourth   toe MTP joints. Peripherally enhancing 2.4 x 3.8 x 3.8 cm deep intramuscular abscess.  UA: Negative   Bcx: NGTD  Wound culture: MRSA  d/hugh unasyn 09/4    Plan:  Continue vanc 1g q12h (trough due 9/9 AM)  f/u blood culture, wound culture  Podiatry consulted, appreciate recs  ID consulted, appreciate recs

## 2024-09-09 LAB
ADD ON TEST-SPECIMEN IN LAB: SIGNIFICANT CHANGE UP
CK SERPL-CCNC: 75 U/L — SIGNIFICANT CHANGE UP (ref 30–200)
DRUG SCREEN, SERUM: ABNORMAL
VANCOMYCIN TROUGH SERPL-MCNC: 8.2 UG/ML — LOW (ref 10–20)

## 2024-09-09 PROCEDURE — 99232 SBSQ HOSP IP/OBS MODERATE 35: CPT

## 2024-09-09 PROCEDURE — 76705 ECHO EXAM OF ABDOMEN: CPT | Mod: 26

## 2024-09-09 PROCEDURE — 99233 SBSQ HOSP IP/OBS HIGH 50: CPT | Mod: GC

## 2024-09-09 RX ORDER — VANCOMYCIN/0.9 % SOD CHLORIDE 1.75G/25
1000 PLASTIC BAG, INJECTION (ML) INTRAVENOUS EVERY 8 HOURS
Refills: 0 | Status: DISCONTINUED | OUTPATIENT
Start: 2024-09-09 | End: 2024-09-09

## 2024-09-09 RX ORDER — DAPTOMYCIN 500 MG/10ML
500 INJECTION, POWDER, LYOPHILIZED, FOR SOLUTION INTRAVENOUS
Refills: 0 | Status: DISCONTINUED | OUTPATIENT
Start: 2024-09-09 | End: 2024-09-09

## 2024-09-09 RX ORDER — DAPTOMYCIN 500 MG/10ML
500 INJECTION, POWDER, LYOPHILIZED, FOR SOLUTION INTRAVENOUS EVERY 24 HOURS
Refills: 0 | Status: DISCONTINUED | OUTPATIENT
Start: 2024-09-09 | End: 2024-09-11

## 2024-09-09 RX ADMIN — ENOXAPARIN SODIUM 40 MILLIGRAM(S): 100 INJECTION SUBCUTANEOUS at 11:13

## 2024-09-09 RX ADMIN — ACETAMINOPHEN 650 MILLIGRAM(S): 325 TABLET ORAL at 18:49

## 2024-09-09 RX ADMIN — POLYETHYLENE GLYCOL 3350 17 GRAM(S): 17 POWDER, FOR SOLUTION ORAL at 11:13

## 2024-09-09 RX ADMIN — ACETAMINOPHEN 650 MILLIGRAM(S): 325 TABLET ORAL at 12:00

## 2024-09-09 RX ADMIN — NIFEDIPINE 30 MILLIGRAM(S): 60 TABLET, FILM COATED, EXTENDED RELEASE ORAL at 11:12

## 2024-09-09 RX ADMIN — ACETAMINOPHEN 650 MILLIGRAM(S): 325 TABLET ORAL at 00:09

## 2024-09-09 RX ADMIN — ACETAMINOPHEN 650 MILLIGRAM(S): 325 TABLET ORAL at 05:22

## 2024-09-09 RX ADMIN — Medication 1 TABLET(S): at 11:13

## 2024-09-09 RX ADMIN — ACETAMINOPHEN 650 MILLIGRAM(S): 325 TABLET ORAL at 11:12

## 2024-09-09 RX ADMIN — Medication 250 MILLIGRAM(S): at 11:13

## 2024-09-09 RX ADMIN — ACETAMINOPHEN 650 MILLIGRAM(S): 325 TABLET ORAL at 18:41

## 2024-09-09 RX ADMIN — DAPTOMYCIN 120 MILLIGRAM(S): 500 INJECTION, POWDER, LYOPHILIZED, FOR SOLUTION INTRAVENOUS at 18:41

## 2024-09-09 RX ADMIN — ACETAMINOPHEN 650 MILLIGRAM(S): 325 TABLET ORAL at 23:40

## 2024-09-09 NOTE — PROGRESS NOTE ADULT - SUBJECTIVE AND OBJECTIVE BOX
INFECTIOUS DISEASES CONSULT FOLLOW-UP NOTE    INTERVAL HPI/OVERNIGHT EVENTS:    Patient seen and examined at bedside. STELLA. afebrile.       ROS:   Constitutional, eyes, ENT, cardiovascular, respiratory, gastrointestinal, genitourinary, integumentary, neurological, psychiatric and heme/lymph are otherwise negative other than noted above       ANTIBIOTICS/RELEVANT:    MEDICATIONS  (STANDING):  acetaminophen     Tablet .. 650 milliGRAM(s) Oral every 6 hours  enoxaparin Injectable 40 milliGRAM(s) SubCutaneous every 24 hours  lidocaine   4% Patch 1 Patch Transdermal daily  lidocaine 1% Injectable 20 milliLiter(s) Local Injection once  multivitamin 1 Tablet(s) Oral daily  naloxone Injectable 0.4 milliGRAM(s) IV Push once  NIFEdipine XL 30 milliGRAM(s) Oral daily  polyethylene glycol 3350 17 Gram(s) Oral daily  senna 2 Tablet(s) Oral at bedtime  vancomycin  IVPB 1000 milliGRAM(s) IV Intermittent every 8 hours    MEDICATIONS  (PRN):  aluminum hydroxide/magnesium hydroxide/simethicone Suspension 30 milliLiter(s) Oral every 4 hours PRN Dyspepsia  bisacodyl 5 milliGRAM(s) Oral daily PRN Constipation  ibuprofen  Tablet. 400 milliGRAM(s) Oral every 6 hours PRN Moderate Pain (4 - 6)  melatonin 3 milliGRAM(s) Oral at bedtime PRN Insomnia  ondansetron Injectable 4 milliGRAM(s) IV Push every 8 hours PRN Nausea and/or Vomiting        Vital Signs Last 24 Hrs  T(C): 37 (09 Sep 2024 11:40), Max: 37.1 (09 Sep 2024 06:15)  T(F): 98.6 (09 Sep 2024 11:40), Max: 98.8 (09 Sep 2024 06:15)  HR: 81 (09 Sep 2024 11:40) (74 - 81)  BP: 146/86 (09 Sep 2024 11:40) (132/87 - 146/86)  BP(mean): --  RR: 16 (09 Sep 2024 11:40) (16 - 18)  SpO2: 97% (09 Sep 2024 11:40) (97% - 98%)    Parameters below as of 09 Sep 2024 11:40  Patient On (Oxygen Delivery Method): room air        PHYSICAL EXAM:  Constitutional: alert, NAD  Eyes: the sclera and conjunctiva were normal.   ENT: the ears and nose were normal in appearance.   Neck: the appearance of the neck was normal and the neck was supple.   Pulmonary: no respiratory distress and lungs were clear to auscultation bilaterally.   Heart: heart rate was normal and rhythm regular, normal S1 and S2  Vascular:. there was no peripheral edema  Abdomen: normal bowel sounds, soft, non-tender  Neurological: no focal deficits.   Psychiatric: the affect was normal  R foot wrapped - dressing c/d/i.          LABS:                MICROBIOLOGY:      RADIOLOGY & ADDITIONAL STUDIES:  Reviewed INFECTIOUS DISEASES CONSULT FOLLOW-UP NOTE    INTERVAL HPI/OVERNIGHT EVENTS:    Patient seen and examined at bedside. STELLA. afebrile.       ROS:   Constitutional, eyes, ENT, cardiovascular, respiratory, gastrointestinal, genitourinary, integumentary, neurological, psychiatric and heme/lymph are otherwise negative other than noted above       ANTIBIOTICS/RELEVANT:    MEDICATIONS  (STANDING):  acetaminophen     Tablet .. 650 milliGRAM(s) Oral every 6 hours  enoxaparin Injectable 40 milliGRAM(s) SubCutaneous every 24 hours  lidocaine   4% Patch 1 Patch Transdermal daily  lidocaine 1% Injectable 20 milliLiter(s) Local Injection once  multivitamin 1 Tablet(s) Oral daily  naloxone Injectable 0.4 milliGRAM(s) IV Push once  NIFEdipine XL 30 milliGRAM(s) Oral daily  polyethylene glycol 3350 17 Gram(s) Oral daily  senna 2 Tablet(s) Oral at bedtime  vancomycin  IVPB 1000 milliGRAM(s) IV Intermittent every 8 hours    MEDICATIONS  (PRN):  aluminum hydroxide/magnesium hydroxide/simethicone Suspension 30 milliLiter(s) Oral every 4 hours PRN Dyspepsia  bisacodyl 5 milliGRAM(s) Oral daily PRN Constipation  ibuprofen  Tablet. 400 milliGRAM(s) Oral every 6 hours PRN Moderate Pain (4 - 6)  melatonin 3 milliGRAM(s) Oral at bedtime PRN Insomnia  ondansetron Injectable 4 milliGRAM(s) IV Push every 8 hours PRN Nausea and/or Vomiting        Vital Signs Last 24 Hrs  T(C): 37 (09 Sep 2024 11:40), Max: 37.1 (09 Sep 2024 06:15)  T(F): 98.6 (09 Sep 2024 11:40), Max: 98.8 (09 Sep 2024 06:15)  HR: 81 (09 Sep 2024 11:40) (74 - 81)  BP: 146/86 (09 Sep 2024 11:40) (132/87 - 146/86)  BP(mean): --  RR: 16 (09 Sep 2024 11:40) (16 - 18)  SpO2: 97% (09 Sep 2024 11:40) (97% - 98%)    Parameters below as of 09 Sep 2024 11:40  Patient On (Oxygen Delivery Method): room air        PHYSICAL EXAM:  Constitutional: alert, NAD  Eyes: the sclera and conjunctiva were normal.   ENT: the ears and nose were normal in appearance.   Neck: the appearance of the neck was normal and the neck was supple.   Pulmonary: no respiratory distress and lungs were clear to auscultation bilaterally.   Heart: heart rate was normal and rhythm regular, normal S1 and S2  Vascular:. there was no peripheral edema  Abdomen: normal bowel sounds, soft, non-tender  Neurological: no focal deficits.   Psychiatric: the affect was normal  R foot wrapped - dressing c/d/i.          LABS:          MICROBIOLOGY:      RADIOLOGY & ADDITIONAL STUDIES:  Reviewed
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
## 2024-09-09 NOTE — PROGRESS NOTE ADULT - PROBLEM SELECTOR PLAN 1
Pt meets criteria for sepsis (SIRS 2/4 (WBC>12, HR>90), with SBP drop > 40 2/2 sepsis  Source: R foot cellulitis) s/p clindamycin in the ED. Recently d/c from [facility] on Cephalexin 500mg  Currently WWP, mentating well, and making urine.    9/1: CT Foot:  Ill-defined moderate volume abscess involving right second and third distal intermetatarsal bursae and fourth MTP joint in the proper clinical setting. No tracking emphysema.  ESR: 68 | .6   9/2 X-ray Foot: Radiodense debris seen adjacent to the fifth MTP joint with chronic appearing erosive change of the fifth metatarsal head and deformity of the proximal phalanx.  9/2 MRI Foot: Hyperacute osteomyelitis/septic arthritis at right third and fourth   toe MTP joints. Peripherally enhancing 2.4 x 3.8 x 3.8 cm deep intramuscular abscess.  UA: Negative   Bcx: NGTD  Wound culture: MRSA  d/hugh unasyn 09/4    Plan:  Continue vanc 1g q12h (trough due 9/9 AM)  f/u blood culture, wound culture  Podiatry consulted, appreciate recs  ID consulted, appreciate recs Pt meets criteria for sepsis (SIRS 2/4 (WBC>12, HR>90), with SBP drop > 40 2/2 sepsis  Source: R foot cellulitis) s/p clindamycin in the ED. Recently d/c from [facility] on Cephalexin 500mg  Currently WWP, mentating well, and making urine.    9/1: CT Foot:  Ill-defined moderate volume abscess involving right second and third distal intermetatarsal bursae and fourth MTP joint in the proper clinical setting. No tracking emphysema.  ESR: 68 | .6   9/2 X-ray Foot: Radiodense debris seen adjacent to the fifth MTP joint with chronic appearing erosive change of the fifth metatarsal head and deformity of the proximal phalanx.  9/2 MRI Foot: Hyperacute osteomyelitis/septic arthritis at right third and fourth   toe MTP joints. Peripherally enhancing 2.4 x 3.8 x 3.8 cm deep intramuscular abscess.  UA: Negative   Bcx: NGTD  Wound culture: MRSA  d/hugh unasyn 09/4    Plan:  vanc trough 8.2 AM -> redosed  vanc 1g q8h   f/u blood culture, wound culture  Podiatry consulted, appreciate recs  ID consulted, appreciate recs
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
## 2024-09-09 NOTE — PROGRESS NOTE ADULT - SUBJECTIVE AND OBJECTIVE BOX
INTERVAL/OVERNIGHT EVENTS: None    SUBJECTIVE:  Patient seen and examined at bedside, comfortable, NAD. Denied fever, chest pain, dyspnea, abdominal pain, foot pain.    Vital Signs Last 12 Hrs  T(F): 98.8 (09-09-24 @ 06:15), Max: 98.8 (09-09-24 @ 06:15)  HR: 75 (09-09-24 @ 06:15) (74 - 75)  BP: 143/97 (09-09-24 @ 06:15) (132/87 - 143/97)  BP(mean): --  RR: 18 (09-09-24 @ 06:15) (18 - 18)  SpO2: 98% (09-09-24 @ 06:15) (98% - 98%)  I&O's Summary      PHYSICAL EXAM:  General: NAD  HEENT: PERRL, EOM intact, sclera anicteric, MMM  Cardiovascular: RRR; no MRG;   Respiratory: CTAB; no WRR  GI/: soft; NTND; BS x4  Extremities: WWP; 2+ peripheral pulses bilaterally; no LE edema; R footwrapped  Skin: normal color & turgor; no rash  Neurologic: aox3; no focal deficits    LABS:                  RADIOLOGY & ADDITIONAL TESTS:    MEDICATIONS  (STANDING):  acetaminophen     Tablet .. 650 milliGRAM(s) Oral every 6 hours  enoxaparin Injectable 40 milliGRAM(s) SubCutaneous every 24 hours  lidocaine   4% Patch 1 Patch Transdermal daily  lidocaine 1% Injectable 20 milliLiter(s) Local Injection once  multivitamin 1 Tablet(s) Oral daily  naloxone Injectable 0.4 milliGRAM(s) IV Push once  NIFEdipine XL 30 milliGRAM(s) Oral daily  polyethylene glycol 3350 17 Gram(s) Oral daily  senna 2 Tablet(s) Oral at bedtime    MEDICATIONS  (PRN):  aluminum hydroxide/magnesium hydroxide/simethicone Suspension 30 milliLiter(s) Oral every 4 hours PRN Dyspepsia  bisacodyl 5 milliGRAM(s) Oral daily PRN Constipation  ibuprofen  Tablet. 400 milliGRAM(s) Oral every 6 hours PRN Moderate Pain (4 - 6)  melatonin 3 milliGRAM(s) Oral at bedtime PRN Insomnia  ondansetron Injectable 4 milliGRAM(s) IV Push every 8 hours PRN Nausea and/or Vomiting   INTERVAL/OVERNIGHT EVENTS: None    SUBJECTIVE:  Patient seen and examined at bedside, comfortable, NAD.  Denied fever, chest pain, dyspnea, abdominal pain, foot pain.    Vital Signs Last 12 Hrs  T(F): 98.8 (09-09-24 @ 06:15), Max: 98.8 (09-09-24 @ 06:15)  HR: 75 (09-09-24 @ 06:15) (74 - 75)  BP: 143/97 (09-09-24 @ 06:15) (132/87 - 143/97)  BP(mean): --  RR: 18 (09-09-24 @ 06:15) (18 - 18)  SpO2: 98% (09-09-24 @ 06:15) (98% - 98%)  I&O's Summary      PHYSICAL EXAM:  General: NAD  HEENT: PERRL, EOM intact, sclera anicteric, MMM  Cardiovascular: RRR; no MRG;   Respiratory: CTAB; no WRR  GI/: soft; NTND; BS x4  Extremities: WWP; 2+ peripheral pulses bilaterally; no LE edema; Rfoot wrapped  Skin: normal color & turgor; no rash  Neurologic: aox3; no focal deficits    LABS:                  RADIOLOGY & ADDITIONAL TESTS:    MEDICATIONS  (STANDING):  acetaminophen     Tablet .. 650 milliGRAM(s) Oral every 6 hours  enoxaparin Injectable 40 milliGRAM(s) SubCutaneous every 24 hours  lidocaine   4% Patch 1 Patch Transdermal daily  lidocaine 1% Injectable 20 milliLiter(s) Local Injection once  multivitamin 1 Tablet(s) Oral daily  naloxone Injectable 0.4 milliGRAM(s) IV Push once  NIFEdipine XL 30 milliGRAM(s) Oral daily  polyethylene glycol 3350 17 Gram(s) Oral daily  senna 2 Tablet(s) Oral at bedtime    MEDICATIONS  (PRN):  aluminum hydroxide/magnesium hydroxide/simethicone Suspension 30 milliLiter(s) Oral every 4 hours PRN Dyspepsia  bisacodyl 5 milliGRAM(s) Oral daily PRN Constipation  ibuprofen  Tablet. 400 milliGRAM(s) Oral every 6 hours PRN Moderate Pain (4 - 6)  melatonin 3 milliGRAM(s) Oral at bedtime PRN Insomnia  ondansetron Injectable 4 milliGRAM(s) IV Push every 8 hours PRN Nausea and/or Vomiting

## 2024-09-09 NOTE — PROGRESS NOTE ADULT - SUBJECTIVE AND OBJECTIVE BOX
Patient is a 49y old  Male who presents with a chief complaint of Sepsis 2/2 R foot cellulitis (09 Sep 2024 07:02)      INTERVAL HPI/ OVERNIGHT EVENTS. Dressing C/D/I. No acute events.       LABS              ICU Vital Signs Last 24 Hrs  T(C): 37.1 (09 Sep 2024 06:15), Max: 37.1 (09 Sep 2024 06:15)  T(F): 98.8 (09 Sep 2024 06:15), Max: 98.8 (09 Sep 2024 06:15)  HR: 75 (09 Sep 2024 06:15) (74 - 92)  BP: 143/97 (09 Sep 2024 06:15) (132/87 - 154/98)  BP(mean): --  ABP: --  ABP(mean): --  RR: 18 (09 Sep 2024 06:15) (18 - 18)  SpO2: 98% (09 Sep 2024 06:15) (97% - 98%)    O2 Parameters below as of 09 Sep 2024 06:15  Patient On (Oxygen Delivery Method): room air            RADIOLOGY    < from: MR Foot w/wo IV Cont, Right (09.02.24 @ 17:20) >  IMPRESSION:    1.  Peripherally enhancing 2.4 x 3.8 x 3.8 cm deep intramuscular abscess   along the dorsolateral margin of right forefoot overlying and   communicating with the third and fourth toe MTP joints.  2.  Hyperacute osteomyelitis/septic arthritis at right third and fourth   toe MTP joints.    --- End of Report ---    < end of copied text >    < from: CT Foot w/ Pt IV Cont, Right (09.01.24 @ 07:09) >    IMPRESSION:    1.  Ill-defined moderate volume abscess involving right second and third   distal intermetatarsal bursae and fourth MTP joint in the proper clinical   setting. No tracking emphysema.  2.  Consider follow-up MRI as clinically indicated.    --- End of Report ---    < end of copied text >    < from: Xray Foot AP + Lateral, Right (09.02.24 @ 08:47) >  IMPRESSION: Radiodense debris seen adjacent to the fifth MTP joint with   chronic appearing erosive change of the fifth metatarsal head and   deformity of the proximal phalanx. Correlation with prior injury is   recommended.    There is diffuse dorsal soft tissue swelling.    --- End of Report ---    < end of copied text >    < from: MR Foot w/wo IV Cont, Right (09.02.24 @ 17:20) >  IMPRESSION:    1.  Peripherally enhancing 2.4 x 3.8 x 3.8 cm deep intramuscular abscess   along the dorsolateral margin of right forefoot overlying and   communicating with the third and fourth toe MTP joints.  2.  Hyperacute osteomyelitis/septic arthritis at right third and fourth   toe MTP joints.    --- End of Report ---    < end of copied text >      MICROBIOLOGY  Culture - Wound Aerobic/Anaerobic (09.02.24 @ 13:44)    -  Clindamycin: S 0.5   -  Erythromycin: R >4   -  Gentamicin: S <=1 Should not be used as monotherapy   -  Linezolid: S 2   -  Oxacillin: R >2   -  Penicillin: R >8   -  Rifampin: S <=1 Should not be used as monotherapy   -  Tetracycline: R >8   -  Trimethoprim/Sulfamethoxazole: S <=0.5/9.5   -  Vancomycin: S 1   Specimen Source: Swab Rt. foot Wound   Culture Results:   Numerous Methicillin Resistant Staphylococcus aureus   Organism Identification: Methicillin resistant Staphylococcus aureus   Organism: Methicillin resistant Staphylococcus aureus   Method Type: KEILY          PHYSICAL EXAM  Lower Extremity Focused  Vasc: DP, PT 2/4. TG warm to warm. Edema to R dorsal foot.   Derm: R dorsal foot wound at 3rd mtpj. Serosanguinous drainage. Negative fluctuance. Improving erythema. Right sub met 2 hyperkeratotic skin.   Neuro: Protective sensation intact.  MSK: +TTP to Right dorsal foot

## 2024-09-09 NOTE — CHART NOTE - NSCHARTNOTEFT_GEN_A_CORE
Admitting Diagnosis:   Patient is a 49y old  Male who presents with a chief complaint of Sepsis 2/2 R foot cellulitis (09 Sep 2024 10:33)    PAST MEDICAL & SURGICAL HISTORY:  HTN (hypertension)  No significant past surgical history    Current Nutrition Order:  DASH/TLC: Sodium & Cholesterol Restricted  Ensure Muscle Health x3/day     PO Intake: Good (%) [ x ]  Fair (50-75%) [   ] Poor (<25%) [   ]    GI Issues:   Pt denies nausea/vomiting/diarrhea/constipation  Reports last BM this AM  No abdominal distension/discomfort noted     Pain:  No pain reported     Skin Integrity:  No pressure injuries documented  1+ left foot edema and right foot cellulitis noted  Juancarlos score 20    Medications:  MEDICATIONS  (STANDING):  acetaminophen     Tablet .. 650 milliGRAM(s) Oral every 6 hours  enoxaparin Injectable 40 milliGRAM(s) SubCutaneous every 24 hours  lidocaine   4% Patch 1 Patch Transdermal daily  lidocaine 1% Injectable 20 milliLiter(s) Local Injection once  multivitamin 1 Tablet(s) Oral daily  naloxone Injectable 0.4 milliGRAM(s) IV Push once  NIFEdipine XL 30 milliGRAM(s) Oral daily  polyethylene glycol 3350 17 Gram(s) Oral daily  senna 2 Tablet(s) Oral at bedtime  vancomycin  IVPB 1000 milliGRAM(s) IV Intermittent every 8 hours    MEDICATIONS  (PRN):  aluminum hydroxide/magnesium hydroxide/simethicone Suspension 30 milliLiter(s) Oral every 4 hours PRN Dyspepsia  bisacodyl 5 milliGRAM(s) Oral daily PRN Constipation  ibuprofen  Tablet. 400 milliGRAM(s) Oral every 6 hours PRN Moderate Pain (4 - 6)  melatonin 3 milliGRAM(s) Oral at bedtime PRN Insomnia  ondansetron Injectable 4 milliGRAM(s) IV Push every 8 hours PRN Nausea and/or Vomiting    Anthropometrics:  Height: 5'8"  Weight: 142lb/64.4kg  BMI: 21.5  IBW: 154lb/70kg             92%IBW    Weight Change:   No new weights obtained since admission. Recommend nursing to trend weekly weights. RD to continue monitoring weights as able.     Severe Protein Calorie Malnutrition: Risk Assessment Completed   - PO intake: <75% needs >1 month  - NFPE: moderate muscle wasting     Estimated energy needs:   Calories: 30-35kcal/k-2254kcal/d  Protein: 1.2-1.5g/k-97g/d  Fluids: 30-35mL/k-2254mL/d  Estimated needs based on dosing wt as within % IBW. Needs adjusted for age, malnutrition, and clinical status.     Subjective:   49M with PMHx of HTN and ulcers who presented with R foot pain, found to have osteomyelitis, admitted for management. Status post I&D bedside (). On IV antibiotics.     Pt seen on 7UR for follow-up. Labs and medication orders reviewed. Ordered for multivitamin. On DASH/TLC diet with Ensure Muscle Health x3/day. Pt resting comfortably this AM. Reports good appetite and intake, RD observed pt completed 100% breakfast this AM. Requesting liberalized diet to increase menu options in setting of prolonged hospital stay. States has not received oral nutrition supplements - Ensure Muscle Health not on in-house formulary, recommend change to Ensure Plus. Pt denies difficulty chewing/swallowing. Denies GI discomfort, reports regular BMs. See nutrition recommendations - RD communicated to team. RD to remain available.     Previous Nutrition Diagnosis:  Severe social/environmental malnutrition related to inadequate PO intake as evidenced by intake <75% of nutrition needs >1 month, moderate muscle wasting.    Active [ x ]  Resolved [   ]    Goal:  Pt to consistently meet >75% nutrient needs. Reduce signs and symptoms of protein-calorie malnutrition.     Recommendations:  1. Recommend Regular diet with Ensure Plus High Protein (350kcal, 20g protein) x3/day to promote intake.   >>Encourage & monitor PO intake. [place] dietary preferences as able.   2. Continue micronutrient supplementation.   3. Monitor GI tolerance, weight trends, labs, & skin integrity.  4. Defer bowel and pain regimens to team.   5. RD to remain available for diet education/intervention prn.     Education:   Encouraged continued adequate intake with emphasis on proteins. Discussed prioritizing whole foods with use of oral nutrition supplements between meals. Pt aware RD remains available for additional questions/concerns.     Risk Level: High [   ] Moderate [ x ] Low [   ]

## 2024-09-09 NOTE — PROGRESS NOTE ADULT - ATTENDING COMMENTS
50 yo M with a PMH of HTN, lower extremity ulcers, presented with R foot pain and found to have ulcers on the foot, now with podiatry following s/p I&D. With improving ulcer and decreasing purulence, wound culture growing MRSA. On extended course of IV vancomycin for treatment.     VS reviewed and stable     Exam:   Appears comfortable on room air   MMM  Normal WOB on RA, CTAB   RRR, no mrg   Abdomen soft, nontender, nondistended  Extremities warm and without edema, R foot bandage clean, dry, intact   AOX3, no focal neuro deficits     Lab holiday with exception of vancomycin trough, trough levels low today.    Imaging reviewed, concerning for MRI foot showing intramuscular abscess in R foot along with 3rd and 4th metatarsal possible OM.     Plan:   -Continue vancomycin (troughs being monitored) for R foot abscess and associated OM, plan for prolonged course through early October   -Appreciate podiatry involvement, to follow up any additional recommendations.   -Can continue low dose nifedipine for blood pressure    Rest as per resident note.

## 2024-09-10 LAB
ANION GAP SERPL CALC-SCNC: 9 MMOL/L — SIGNIFICANT CHANGE UP (ref 5–17)
BASOPHILS # BLD AUTO: 0 K/UL — SIGNIFICANT CHANGE UP (ref 0–0.2)
BASOPHILS NFR BLD AUTO: 0 % — SIGNIFICANT CHANGE UP (ref 0–2)
BUN SERPL-MCNC: 18 MG/DL — SIGNIFICANT CHANGE UP (ref 7–23)
CALCIUM SERPL-MCNC: 9 MG/DL — SIGNIFICANT CHANGE UP (ref 8.4–10.5)
CHLORIDE SERPL-SCNC: 102 MMOL/L — SIGNIFICANT CHANGE UP (ref 96–108)
CO2 SERPL-SCNC: 27 MMOL/L — SIGNIFICANT CHANGE UP (ref 22–31)
CREAT SERPL-MCNC: 0.87 MG/DL — SIGNIFICANT CHANGE UP (ref 0.5–1.3)
EGFR: 104 ML/MIN/1.73M2 — SIGNIFICANT CHANGE UP
EOSINOPHIL # BLD AUTO: 0.17 K/UL — SIGNIFICANT CHANGE UP (ref 0–0.5)
EOSINOPHIL NFR BLD AUTO: 1.8 % — SIGNIFICANT CHANGE UP (ref 0–6)
GIANT PLATELETS BLD QL SMEAR: PRESENT — SIGNIFICANT CHANGE UP
GLUCOSE SERPL-MCNC: 89 MG/DL — SIGNIFICANT CHANGE UP (ref 70–99)
HCT VFR BLD CALC: 40.6 % — SIGNIFICANT CHANGE UP (ref 39–50)
HGB BLD-MCNC: 13 G/DL — SIGNIFICANT CHANGE UP (ref 13–17)
LYMPHOCYTES # BLD AUTO: 1.69 K/UL — SIGNIFICANT CHANGE UP (ref 1–3.3)
LYMPHOCYTES # BLD AUTO: 17.7 % — SIGNIFICANT CHANGE UP (ref 13–44)
MAGNESIUM SERPL-MCNC: 2 MG/DL — SIGNIFICANT CHANGE UP (ref 1.6–2.6)
MANUAL SMEAR VERIFICATION: SIGNIFICANT CHANGE UP
MCHC RBC-ENTMCNC: 28.3 PG — SIGNIFICANT CHANGE UP (ref 27–34)
MCHC RBC-ENTMCNC: 32 GM/DL — SIGNIFICANT CHANGE UP (ref 32–36)
MCV RBC AUTO: 88.3 FL — SIGNIFICANT CHANGE UP (ref 80–100)
METAMYELOCYTES # FLD: 0.9 % — HIGH (ref 0–0)
MONOCYTES # BLD AUTO: 0.59 K/UL — SIGNIFICANT CHANGE UP (ref 0–0.9)
MONOCYTES NFR BLD AUTO: 6.2 % — SIGNIFICANT CHANGE UP (ref 2–14)
MYELOCYTES NFR BLD: 0.9 % — HIGH (ref 0–0)
NEUTROPHILS # BLD AUTO: 6.92 K/UL — SIGNIFICANT CHANGE UP (ref 1.8–7.4)
NEUTROPHILS NFR BLD AUTO: 72.5 % — SIGNIFICANT CHANGE UP (ref 43–77)
NRBC # BLD: 1 /100 WBCS — HIGH (ref 0–0)
NRBC # BLD: SIGNIFICANT CHANGE UP /100 WBCS (ref 0–0)
OVALOCYTES BLD QL SMEAR: SLIGHT — SIGNIFICANT CHANGE UP
PHOSPHATE SERPL-MCNC: 4.5 MG/DL — SIGNIFICANT CHANGE UP (ref 2.5–4.5)
PLAT MORPH BLD: ABNORMAL
PLATELET # BLD AUTO: 556 K/UL — HIGH (ref 150–400)
POIKILOCYTOSIS BLD QL AUTO: SLIGHT — SIGNIFICANT CHANGE UP
POTASSIUM SERPL-MCNC: 4.6 MMOL/L — SIGNIFICANT CHANGE UP (ref 3.5–5.3)
POTASSIUM SERPL-SCNC: 4.6 MMOL/L — SIGNIFICANT CHANGE UP (ref 3.5–5.3)
RBC # BLD: 4.6 M/UL — SIGNIFICANT CHANGE UP (ref 4.2–5.8)
RBC # FLD: 12.7 % — SIGNIFICANT CHANGE UP (ref 10.3–14.5)
RBC BLD AUTO: ABNORMAL
SODIUM SERPL-SCNC: 138 MMOL/L — SIGNIFICANT CHANGE UP (ref 135–145)
WBC # BLD: 9.54 K/UL — SIGNIFICANT CHANGE UP (ref 3.8–10.5)
WBC # FLD AUTO: 9.54 K/UL — SIGNIFICANT CHANGE UP (ref 3.8–10.5)

## 2024-09-10 PROCEDURE — 99233 SBSQ HOSP IP/OBS HIGH 50: CPT | Mod: GC

## 2024-09-10 RX ADMIN — DAPTOMYCIN 120 MILLIGRAM(S): 500 INJECTION, POWDER, LYOPHILIZED, FOR SOLUTION INTRAVENOUS at 18:33

## 2024-09-10 RX ADMIN — ACETAMINOPHEN 650 MILLIGRAM(S): 325 TABLET ORAL at 12:50

## 2024-09-10 RX ADMIN — Medication 1 TABLET(S): at 11:59

## 2024-09-10 RX ADMIN — ACETAMINOPHEN 650 MILLIGRAM(S): 325 TABLET ORAL at 11:59

## 2024-09-10 RX ADMIN — ACETAMINOPHEN 650 MILLIGRAM(S): 325 TABLET ORAL at 23:04

## 2024-09-10 RX ADMIN — NIFEDIPINE 30 MILLIGRAM(S): 60 TABLET, FILM COATED, EXTENDED RELEASE ORAL at 12:00

## 2024-09-10 RX ADMIN — ACETAMINOPHEN 650 MILLIGRAM(S): 325 TABLET ORAL at 06:15

## 2024-09-10 RX ADMIN — ACETAMINOPHEN 650 MILLIGRAM(S): 325 TABLET ORAL at 17:23

## 2024-09-10 RX ADMIN — ACETAMINOPHEN 650 MILLIGRAM(S): 325 TABLET ORAL at 00:00

## 2024-09-10 RX ADMIN — ACETAMINOPHEN 650 MILLIGRAM(S): 325 TABLET ORAL at 07:15

## 2024-09-10 NOTE — PROGRESS NOTE ADULT - ATTENDING COMMENTS
48 yo M with a PMH of HTN, lower extremity ulcers, presented with R foot pain and found to have ulcers on the foot, now with podiatry following s/p I&D. With improving ulcer and decreasing purulence, wound culture growing MRSA. On extended course of antibiotics for treatment of osteomyelitis, recently switched to daptomycin.     VS reviewed and stable     Exam:   Appears comfortable on room air   MMM  Normal WOB on RA, CTAB   RRR, no mrg   Abdomen soft, nontender, nondistended  Extremities warm and without edema, R foot bandage clean, dry, intact   AOX3, no focal neuro deficits     Lab holiday given stable labs each day.    Imaging reviewed, concerning for MRI foot showing intramuscular abscess in R foot along with 3rd and 4th metatarsal possible OM.     Plan:   -Continue daptomycin through early October 10/12 (recently switched from vancomycin due to volatility in required dosing based on levels)   -Appreciate podiatry involvement, to follow up any additional recommendations.   -Can continue low dose nifedipine for blood pressure    Rest as per resident note.

## 2024-09-10 NOTE — PROGRESS NOTE ADULT - SUBJECTIVE AND OBJECTIVE BOX
**INCOMPLETE NOTE    INTERVAL/OVERNIGHT EVENTS: None    SUBJECTIVE:  Patient seen and examined at bedside, comfortable, NAD. Denied fever, chest pain, dyspnea, abdominal pain.     Vital Signs Last 12 Hrs  T(F): 99 (09-10-24 @ 05:22), Max: 99 (09-10-24 @ 05:22)  HR: 81 (09-10-24 @ 05:22) (81 - 98)  BP: 135/83 (09-10-24 @ 05:22) (135/83 - 149/94)  BP(mean): --  RR: 18 (09-10-24 @ 05:22) (18 - 18)  SpO2: 97% (09-10-24 @ 05:22) (97% - 98%)  I&O's Summary      PHYSICAL EXAM:  General: NAD  HEENT: PERRL, EOM intact, sclera anicteric, MMM  Cardiovascular: RRR; no MRG;   Respiratory: CTAB; no WRR  GI/: soft; NTND; BS x4  Extremities: WWP; 2+ peripheral pulses bilaterally; no LE edema  Skin: normal color & turgor; no rash  Neurologic: aox3; no focal deficits    LABS:      RADIOLOGY & ADDITIONAL TESTS:    MEDICATIONS  (STANDING):  acetaminophen     Tablet .. 650 milliGRAM(s) Oral every 6 hours  DAPTOmycin IVPB 500 milliGRAM(s) IV Intermittent every 24 hours  enoxaparin Injectable 40 milliGRAM(s) SubCutaneous every 24 hours  lidocaine   4% Patch 1 Patch Transdermal daily  lidocaine 1% Injectable 20 milliLiter(s) Local Injection once  multivitamin 1 Tablet(s) Oral daily  naloxone Injectable 0.4 milliGRAM(s) IV Push once  NIFEdipine XL 30 milliGRAM(s) Oral daily  polyethylene glycol 3350 17 Gram(s) Oral daily  senna 2 Tablet(s) Oral at bedtime    MEDICATIONS  (PRN):  aluminum hydroxide/magnesium hydroxide/simethicone Suspension 30 milliLiter(s) Oral every 4 hours PRN Dyspepsia  bisacodyl 5 milliGRAM(s) Oral daily PRN Constipation  ibuprofen  Tablet. 400 milliGRAM(s) Oral every 6 hours PRN Moderate Pain (4 - 6)  melatonin 3 milliGRAM(s) Oral at bedtime PRN Insomnia  ondansetron Injectable 4 milliGRAM(s) IV Push every 8 hours PRN Nausea and/or Vomiting   INTERVAL/OVERNIGHT EVENTS: None    SUBJECTIVE:  Patient seen and examined at bedside, comfortable, NAD. He is feeling overall well. No pain in R foot. He is moving his bowels daily. Denied fever, chest pain, dyspnea, abdominal pain.     Vital Signs Last 12 Hrs  T(F): 99 (09-10-24 @ 05:22), Max: 99 (09-10-24 @ 05:22)  HR: 81 (09-10-24 @ 05:22) (81 - 98)  BP: 135/83 (09-10-24 @ 05:22) (135/83 - 149/94)  BP(mean): --  RR: 18 (09-10-24 @ 05:22) (18 - 18)  SpO2: 97% (09-10-24 @ 05:22) (97% - 98%)  I&O's Summary      PHYSICAL EXAM:  General: NAD  HEENT: PERRL, EOM intact, sclera anicteric, MMM  Cardiovascular: RRR; no MRG;   Respiratory: CTAB; no WRR  GI/: soft; NTND; BS x4  Extremities: WWP; 2+ peripheral pulses bilaterally; no LE edema, R foot wrapped  Skin: normal color & turgor; no rash  Neurologic: aox3; no focal deficits    LABS:      RADIOLOGY & ADDITIONAL TESTS:    MEDICATIONS  (STANDING):  acetaminophen     Tablet .. 650 milliGRAM(s) Oral every 6 hours  DAPTOmycin IVPB 500 milliGRAM(s) IV Intermittent every 24 hours  enoxaparin Injectable 40 milliGRAM(s) SubCutaneous every 24 hours  lidocaine   4% Patch 1 Patch Transdermal daily  lidocaine 1% Injectable 20 milliLiter(s) Local Injection once  multivitamin 1 Tablet(s) Oral daily  naloxone Injectable 0.4 milliGRAM(s) IV Push once  NIFEdipine XL 30 milliGRAM(s) Oral daily  polyethylene glycol 3350 17 Gram(s) Oral daily  senna 2 Tablet(s) Oral at bedtime    MEDICATIONS  (PRN):  aluminum hydroxide/magnesium hydroxide/simethicone Suspension 30 milliLiter(s) Oral every 4 hours PRN Dyspepsia  bisacodyl 5 milliGRAM(s) Oral daily PRN Constipation  ibuprofen  Tablet. 400 milliGRAM(s) Oral every 6 hours PRN Moderate Pain (4 - 6)  melatonin 3 milliGRAM(s) Oral at bedtime PRN Insomnia  ondansetron Injectable 4 milliGRAM(s) IV Push every 8 hours PRN Nausea and/or Vomiting

## 2024-09-10 NOTE — PROGRESS NOTE ADULT - PROBLEM SELECTOR PLAN 1
Pt meets criteria for sepsis (SIRS 2/4 (WBC>12, HR>90), with SBP drop > 40 2/2 sepsis  Source: R foot cellulitis) s/p clindamycin in the ED. Recently d/c from [facility] on Cephalexin 500mg  Currently WWP, mentating well, and making urine.    9/1: CT Foot:  Ill-defined moderate volume abscess involving right second and third distal intermetatarsal bursae and fourth MTP joint in the proper clinical setting. No tracking emphysema.  ESR: 68 | .6   9/2 X-ray Foot: Radiodense debris seen adjacent to the fifth MTP joint with chronic appearing erosive change of the fifth metatarsal head and deformity of the proximal phalanx.  9/2 MRI Foot: Hyperacute osteomyelitis/septic arthritis at right third and fourth   toe MTP joints. Peripherally enhancing 2.4 x 3.8 x 3.8 cm deep intramuscular abscess.  UA: Negative   Bcx: NGTD  Wound culture: MRSA  d/hugh unasyn 09/4  d/hugh vanc 9/9  Plan:  c/w daptomycin 500mg q24h   obtain CK twice weekly  Podiatry consulted, appreciate recs  ID consulted, appreciate recs

## 2024-09-11 ENCOUNTER — TRANSCRIPTION ENCOUNTER (OUTPATIENT)
Age: 49
End: 2024-09-11

## 2024-09-11 DIAGNOSIS — R19.00 INTRA-ABDOMINAL AND PELVIC SWELLING, MASS AND LUMP, UNSPECIFIED SITE: ICD-10-CM

## 2024-09-11 PROCEDURE — 71250 CT THORAX DX C-: CPT | Mod: 26

## 2024-09-11 PROCEDURE — 99233 SBSQ HOSP IP/OBS HIGH 50: CPT | Mod: GC

## 2024-09-11 RX ORDER — DAPTOMYCIN 500 MG/10ML
500 INJECTION, POWDER, LYOPHILIZED, FOR SOLUTION INTRAVENOUS EVERY 24 HOURS
Refills: 0 | Status: DISCONTINUED | OUTPATIENT
Start: 2024-09-11 | End: 2024-09-12

## 2024-09-11 RX ADMIN — Medication 1 TABLET(S): at 11:21

## 2024-09-11 RX ADMIN — ACETAMINOPHEN 650 MILLIGRAM(S): 325 TABLET ORAL at 17:41

## 2024-09-11 RX ADMIN — DAPTOMYCIN 120 MILLIGRAM(S): 500 INJECTION, POWDER, LYOPHILIZED, FOR SOLUTION INTRAVENOUS at 17:41

## 2024-09-11 RX ADMIN — ACETAMINOPHEN 650 MILLIGRAM(S): 325 TABLET ORAL at 05:36

## 2024-09-11 RX ADMIN — ACETAMINOPHEN 650 MILLIGRAM(S): 325 TABLET ORAL at 18:41

## 2024-09-11 RX ADMIN — NIFEDIPINE 30 MILLIGRAM(S): 60 TABLET, FILM COATED, EXTENDED RELEASE ORAL at 09:01

## 2024-09-11 RX ADMIN — ENOXAPARIN SODIUM 40 MILLIGRAM(S): 100 INJECTION SUBCUTANEOUS at 11:20

## 2024-09-11 RX ADMIN — ACETAMINOPHEN 650 MILLIGRAM(S): 325 TABLET ORAL at 12:21

## 2024-09-11 RX ADMIN — ACETAMINOPHEN 650 MILLIGRAM(S): 325 TABLET ORAL at 11:21
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
## 2024-09-11 NOTE — PROGRESS NOTE ADULT - PROBLEM SELECTOR PLAN 1
Pt meets criteria for sepsis (SIRS 2/4 (WBC>12, HR>90), with SBP drop > 40 2/2 sepsis  Source: R foot cellulitis) s/p clindamycin in the ED. Recently d/c from [facility] on Cephalexin 500mg  Currently WWP, mentating well, and making urine.    9/1: CT Foot:  Ill-defined moderate volume abscess involving right second and third distal intermetatarsal bursae and fourth MTP joint in the proper clinical setting. No tracking emphysema.  ESR: 68 | .6   9/2 X-ray Foot: Radiodense debris seen adjacent to the fifth MTP joint with chronic appearing erosive change of the fifth metatarsal head and deformity of the proximal phalanx.  9/2 MRI Foot: Hyperacute osteomyelitis/septic arthritis at right third and fourth toe MTP joints. Peripherally enhancing 2.4 x 3.8 x 3.8 cm deep intramuscular abscess.  UA: Negative   Bcx: NGTD  Wound culture: MRSA  d/hugh unasyn 09/4  d/hugh vanc 9/9 due to inconsistent vanc trough drawings and levels  Plan:  c/w daptomycin 500mg q24h (9/1 -10/12  obtain CK once weekly   Podiatry consulted, appreciate recs  ID consulted, appreciate recs

## 2024-09-11 NOTE — DISCHARGE NOTE PROVIDER - NSDCFUSCHEDAPPT_GEN_ALL_CORE_FT
[facility]  INTMED [address]  Scheduled Appointment: 09/19/2024     [facility]  INTMED [address]  Scheduled Appointment: 09/19/2024    Richard Becerril  [facility]  HEARTVASC [address]  Scheduled Appointment: 09/24/2024

## 2024-09-11 NOTE — DISCHARGE NOTE PROVIDER - CARE PROVIDERS DIRECT ADDRESSES
,[email or web address]

## 2024-09-11 NOTE — DISCHARGE NOTE PROVIDER - HOSPITAL COURSE
[ ASSESSMENT/1L (came w..., found to have...) ]     Hospital course (problem-based):    New medications:   Changes to old medications:  Items to follow up outpatient:  Physical exam at time of discharge:   49M undomiciled w/ PMHx of HTN, Ulcers(not on meds) with c/o R foot pain, found to have cellulitis and concern for alcohol withdrawal, admitted for Severe Sepsis 2/2 Cellulitis found to Acute Osteomyelitis. Pt is s/p I&D of abscess with purulence expressed and wound culture growing MRSA. Pt was unable to tolerate a jaun    Hospital course (problem-based):    New medications:   Changes to old medications:  Items to follow up outpatient:  Physical exam at time of discharge:   49M undomiciled w/ PMHx of HTN, Ulcers(not on meds) with c/o R foot pain, found to have cellulitis and concern for alcohol withdrawal, admitted for Severe Sepsis 2/2 Cellulitis found to Acute Osteomyelitis. Pt is s/p I&D of abscess with purulence expressed and wound culture growing MRSA, currently on daptomycin.    Severe sepsis.   Pt meets criteria for sepsis (SIRS 2/4 (WBC>12, HR>90), with SBP drop > 40 2/2 sepsis  Source: R foot cellulitis) s/p clindamycin in the ED. Recently d/c from [facility] on Cephalexin 500mg  Currently WWP, mentating well, and making urine.    9/1: CT Foot:  Ill-defined moderate volume abscess involving right second and third distal intermetatarsal bursae and fourth MTP joint in the proper clinical setting. No tracking emphysema.  ESR: 68 | .6   9/2 X-ray Foot: Radiodense debris seen adjacent to the fifth MTP joint with chronic appearing erosive change of the fifth metatarsal head and deformity of the proximal phalanx.  9/2 MRI Foot: Hyperacute osteomyelitis/septic arthritis at right third and fourth toe MTP joints. Peripherally enhancing 2.4 x 3.8 x 3.8 cm deep intramuscular abscess.  UA: Negative   Bcx: NGTD; Wound culture: MRSA  d/hugh vanc 9/9 due to inconsistent vanc trough drawings and levels  Plan:  c/w daptomycin 500mg q24h (9/1 -10/12)  obtain CK once weekly   follow up outpatient with PCP  follow up outpatient with infectious disease    Acute osteomyelitis.   9/1: CT Foot:  Ill-defined moderate volume abscess involving right second and third distal intermetatarsal bursae and fourth MTP joint in the proper clinical setting. No tracking emphysema.  ESR: 68 | .6   9/2 X-ray Foot: Radiodense debris seen adjacent to the fifth MTP joint with chronic appearing erosive change of the fifth metatarsal head and deformity of the proximal phalanx.  9/2 MRI Foot: Hyperacute osteomyelitis/septic arthritis at right third and fourth   toe MTP joints. Peripherally enhancing 2.4 x 3.8 x 3.8 cm deep intramuscular abscess.  Plan:   -Continue with plan listed above.    3:Abscess.   9/2 MRI Foot: Hyperacute osteomyelitis/septic arthritis at right third and fourth   toe MTP joints. Peripherally enhancing 2.4 x 3.8 x 3.8 cm deep intramuscular abscess.  s/p I/D x1 (9/2) by podiatry   Plan:  -As above.    4:Cellulitis.   resentation: R foot pain 2/2 cellulitis  CT Foot:  Ill-defined moderate volume abscess involving right second and third distal intermetatarsal bursae and fourth MTP joint in the proper clinical setting. No tracking emphysema.  Plan:  -Plan as above    Lump in the abdomen.   pt with lump in LUQ, mobile and nontender  9/9 US abdomen: Palpable lump in the left upper quadrant is likely due to protuberant or enlarged costochondral junction.   9/11 CT chest with emphysema, Left upper lobe 3 mm nodule. One-year follow-up CT recommended  Plan:  - follow up outpatient with PCP  - One-year follow-up CT recommended    Hypertensive urgency.   Not on any home meds, p/w /104 s/p lopressor 50x1,  Hydral 50x1 in ED  EKG: NSR, HR-96, QTc-454  TTE (09/04) with LVH, mild aortic regurgitation, small pericardial effusion  Plan:  -C/w Nifedipine xl 30mg QD  - f/u cards outpatient for TTE findings.    Drug abuse.   Smokes cocaine and crack, last smoked 1 month ago  Urine: THC +, Cocaine +  HIV: Non Reactive   Plan:  -Consider STI screening outpatient.    New medications: Daptomycin, nifedipine  Changes to old medications: None  Items to follow up outpatient: PCP, cardiology, infectious disease  Physical exam at time of discharge:  General: NAD  HEENT: PERRL, EOM intact, sclera anicteric, MMM  Cardiovascular: RRR; no MRG;   Respiratory: CTAB; no WRR  GI/: soft; NTND; BS x4  Extremities: WWP; 2+ peripheral pulses bilaterally; no LE edema, R foot wrapped C/D/I  Skin: normal color & turgor; no rash  Neurologic: aox3; no focal deficits   49M undomiciled w/ PMHx of HTN, Ulcers(not on meds) with c/o R foot pain, found to have cellulitis and concern for alcohol withdrawal, admitted for Severe Sepsis 2/2 Cellulitis found to Acute Osteomyelitis. Pt is s/p I&D of abscess with purulence expressed and wound culture growing MRSA, currently on daptomycin.    Severe sepsis.   Pt meets criteria for sepsis (SIRS 2/4 (WBC>12, HR>90), with SBP drop > 40 2/2 sepsis  Source: R foot cellulitis) s/p clindamycin in the ED. Recently d/c from [facility] on Cephalexin 500mg  Currently WWP, mentating well, and making urine.    9/1: CT Foot:  Ill-defined moderate volume abscess involving right second and third distal intermetatarsal bursae and fourth MTP joint in the proper clinical setting. No tracking emphysema.  ESR: 68 | .6   9/2 X-ray Foot: Radiodense debris seen adjacent to the fifth MTP joint with chronic appearing erosive change of the fifth metatarsal head and deformity of the proximal phalanx.  9/2 MRI Foot: Hyperacute osteomyelitis/septic arthritis at right third and fourth toe MTP joints. Peripherally enhancing 2.4 x 3.8 x 3.8 cm deep intramuscular abscess.  UA: Negative   Bcx: NGTD; Wound culture: MRSA  d/hugh vanc 9/9 due to inconsistent vanc trough drawings and levels  Plan:  c/w daptomycin 500mg q24h (9/1 -10/12)  obtain CK once weekly   follow up outpatient with PCP  follow up outpatient with infectious disease    Acute osteomyelitis.   9/1: CT Foot:  Ill-defined moderate volume abscess involving right second and third distal intermetatarsal bursae and fourth MTP joint in the proper clinical setting. No tracking emphysema.  ESR: 68 | .6   9/2 X-ray Foot: Radiodense debris seen adjacent to the fifth MTP joint with chronic appearing erosive change of the fifth metatarsal head and deformity of the proximal phalanx.  9/2 MRI Foot: Hyperacute osteomyelitis/septic arthritis at right third and fourth   toe MTP joints. Peripherally enhancing 2.4 x 3.8 x 3.8 cm deep intramuscular abscess.  Plan:   -Continue with plan listed above.    3:Abscess.   9/2 MRI Foot: Hyperacute osteomyelitis/septic arthritis at right third and fourth   toe MTP joints. Peripherally enhancing 2.4 x 3.8 x 3.8 cm deep intramuscular abscess.  s/p I/D x1 (9/2) by podiatry   Plan:  -As above.    4:Cellulitis.   Presentation: R foot pain 2/2 cellulitis  CT Foot:  Ill-defined moderate volume abscess involving right second and third distal intermetatarsal bursae and fourth MTP joint in the proper clinical setting. No tracking emphysema.  Plan:  -Plan as above    Lump in the abdomen.   pt with lump in LUQ, mobile and nontender  9/9 US abdomen: Palpable lump in the left upper quadrant is likely due to protuberant or enlarged costochondral junction.   9/11 CT chest with emphysema, Left upper lobe 3 mm nodule. One-year follow-up CT recommended  Plan:  - follow up outpatient with PCP  - One-year follow-up CT recommended    Hypertensive urgency.   Not on any home meds, p/w /104 s/p lopressor 50x1,  Hydral 50x1 in ED  EKG: NSR, HR-96, QTc-454  TTE (09/04) with LVH, mild aortic regurgitation, small pericardial effusion  Plan:  -C/w Nifedipine xl 30mg QD  - f/u cards outpatient for TTE findings.    Drug abuse.   Smokes cocaine and crack, last smoked 1 month ago  Urine: THC +, Cocaine +  HIV: Non Reactive   Plan:  -Consider STI screening outpatient.    New medications: Daptomycin, nifedipine  Changes to old medications: None  Items to follow up outpatient: PCP, cardiology, infectious disease  Physical exam at time of discharge:  General: NAD  HEENT: PERRL, EOM intact, sclera anicteric, MMM  Cardiovascular: RRR; no MRG;   Respiratory: CTAB; no WRR  GI/: soft; NTND; BS x4  Extremities: WWP; 2+ peripheral pulses bilaterally; no LE edema, R foot wrapped C/D/I  Skin: normal color & turgor; no rash  Neurologic: aox3; no focal deficits   49M undomiciled w/ PMHx of HTN, Ulcers(not on meds) with c/o R foot pain, found to have cellulitis and concern for alcohol withdrawal, admitted for Severe Sepsis 2/2 Cellulitis found to Acute Osteomyelitis. Pt is s/p I&D of abscess with purulence expressed and wound culture growing MRSA, currently on daptomycin.  Severe sepsis.   Pt meets criteria for sepsis (SIRS 2/4 (WBC>12, HR>90), with SBP drop > 40 2/2 sepsis  Source: R foot cellulitis) s/p clindamycin in the ED. Recently d/c from [facility] on Cephalexin 500mg  Currently WWP, mentating well, and making urine.    9/1: CT Foot:  Ill-defined moderate volume abscess involving right second and third distal intermetatarsal bursae and fourth MTP joint in the proper clinical setting. No tracking emphysema.  ESR: 68 | .6   9/2 X-ray Foot: Radiodense debris seen adjacent to the fifth MTP joint with chronic appearing erosive change of the fifth metatarsal head and deformity of the proximal phalanx.  9/2 MRI Foot: Hyperacute osteomyelitis/septic arthritis at right third and fourth toe MTP joints. Peripherally enhancing 2.4 x 3.8 x 3.8 cm deep intramuscular abscess.  UA: Negative   Bcx: NGTD; Wound culture: MRSA  d/hugh vanc 9/9 due to inconsistent vanc trough drawings and levels  Plan:  c/w daptomycin 500mg q24h (9/1 -10/12)  obtain CK once weekl  follow up outpatient with PCP  follow up outpatient with infectious disease    Acute osteomyelitis.   9/1: CT Foot:  Ill-defined moderate volume abscess involving right second and third distal intermetatarsal bursae and fourth MTP joint in the proper clinical setting. No tracking emphysema.  ESR: 68 | .6   9/2 X-ray Foot: Radiodense debris seen adjacent to the fifth MTP joint with chronic appearing erosive change of the fifth metatarsal head and deformity of the proximal phalanx.  9/2 MRI Foot: Hyperacute osteomyelitis/septic arthritis at right third and fourth   toe MTP joints. Peripherally enhancing 2.4 x 3.8 x 3.8 cm deep intramuscular abscess.  Plan:   -Continue with plan listed above.    3:Abscess.   9/2 MRI Foot: Hyperacute osteomyelitis/septic arthritis at right third and fourth   toe MTP joints. Peripherally enhancing 2.4 x 3.8 x 3.8 cm deep intramuscular abscess.  s/p I/D x1 (9/2) by podiatry   Plan:  -As above.    4:Cellulitis.   Presentation: R foot pain 2/2 cellulitis  CT Foot:  Ill-defined moderate volume abscess involving right second and third distal intermetatarsal bursae and fourth MTP joint in the proper clinical setting. No tracking emphysema.  Plan:  -Plan as above    Lump in the abdomen.   pt with lump in LUQ, mobile and nontender  9/9 US abdomen: Palpable lump in the left upper quadrant is likely due to protuberant or enlarged costochondral junction.   9/11 CT chest with emphysema, Left upper lobe 3 mm nodule. One-year follow-up CT recommended  Plan:  - follow up outpatient with PCP  - One-year follow-up CT recommended    Hypertensive urgency.   Not on any home meds, p/w /104 s/p lopressor 50x1,  Hydral 50x1 in ED  EKG: NSR, HR-96, QTc-454  TTE (09/04) with LVH, mild aortic regurgitation, small pericardial effusion  Plan:  -C/w Nifedipine xl 30mg QD  - f/u cards outpatient for TTE findings.    Drug abuse.   Smokes cocaine and crack, last smoked 1 month ago  Urine: THC +, Cocaine +  HIV: Non Reactive   Plan:  -Consider STI screening outpatient.    New medications: Daptomycin, nifedipine  Changes to old medications: None  Items to follow up outpatient: PCP, cardiology, infectious disease  Physical exam at time of discharge:  General: NAD  HEENT: PERRL, EOM intact, sclera anicteric, MMM  Cardiovascular: RRR; no MRG;   Respiratory: CTAB; no WRR  GI/: soft; NTND; BS x4  Extremities: WWP; 2+ peripheral pulses bilaterally; no LE edema, R foot wrapped C/D/I  Skin: normal color & turgor; no rash  Neurologic: aox3; no focal deficits

## 2024-09-11 NOTE — DISCHARGE NOTE PROVIDER - NSDCFUADDAPPT_GEN_ALL_CORE_FT
Please bring your Insurance card, Photo ID and Discharge paperwork to the following appointment:    Please follow up with [facility] with Dr. Ruben Loya on behalf of Dr. Tim Stevenson at [address] [place] [address], [address], [place] [address] on 9/19/2024 at 12:00pm.    Appointment was scheduled by Ms. SHIRA Mejias, Referral Coordinator.   Please bring your Insurance card, Photo ID and Discharge paperwork to the following appointment:    (1) Please follow up with [facility] with Dr. Ruben Loya on behalf of Dr. Tim Stevenson at [address] [place] [address], [address], [place] [address] on 9/19/2024 at 12:00pm.    Appointment was scheduled by Ms. SHIRA Mejias, Referral Coordinator.   Please bring your Insurance card, Photo ID and Discharge paperwork to the following appointments:    (1) Please follow up with [facility] with Dr. Ruben Loya on behalf of Dr. Tim Stevenson at [address], [address], [place] [address] on 9/19/2024 at 12:00pm.    Appointment was scheduled by Ms. SHIRA Mejias, Referral Coordinator.    (2) Please follow up with your Cardiology Provider, Dr. Richard Becerril at [address] [place] [address] on 9/24/2024 at 1:15pm.    Appointment was scheduled by Ms. SHIRA Mejias, Referral Coordinator.   Please bring your Insurance card, Photo ID and Discharge paperwork to the following appointments:    (1) Please follow up with [facility] with Dr. Ruben Loya on behalf of Dr. Tim Stevenson at [address], [address], [place] [address] on 9/19/2024 at 12:00pm.    Appointment was scheduled by Ms. SHIRA Mejias, Referral Coordinator.    (2) Please follow up with your Cardiology Provider, Dr. Richard Becerril at [address] [place] [address] on 9/24/2024 at 1:15pm.    Appointment was scheduled by Ms. SHIRA Mejias, Referral Coordinator.    (3) Please follow up with a podiatrist. Please call to schedule an appointment.  [facility]   Address: [address] [place] [address]  Phone: [phone]

## 2024-09-11 NOTE — DISCHARGE NOTE PROVIDER - PROVIDER TOKENS
PROVIDER:[TOKEN:[[id number]]] PROVIDER:[TOKEN:[[id number]]],PROVIDER:[TOKEN:[[id number]],SCHEDULEDAPPT:[09/19/2024],SCHEDULEDAPPTTIME:[12:00 PM]] PROVIDER:[TOKEN:[[id number]],SCHEDULEDAPPT:[09/19/2024],SCHEDULEDAPPTTIME:[12:00 PM]],PROVIDER:[TOKEN:[[id number]],SCHEDULEDAPPT:[09/24/2024],SCHEDULEDAPPTTIME:[01:15 PM]],PROVIDER:[TOKEN:[[id number]]]

## 2024-09-11 NOTE — PROGRESS NOTE ADULT - SUBJECTIVE AND OBJECTIVE BOX
Patient is a 49y old  Male who presents with a chief complaint of Sepsis 2/2 R foot cellulitis (11 Sep 2024 07:13)      INTERVAL HPI/ OVERNIGHT EVENTS: Pt evaluated this morning. Resting comfortably with no complaints. Dressing dry intact and clean.       LABS                        13.0   9.54  )-----------( 556      ( 10 Sep 2024 09:11 )             40.6     09-10    138  |  102  |  18  ----------------------------<  89  4.6   |  27  |  0.87    Ca    9.0      10 Sep 2024 09:11  Phos  4.5     09-10  Mg     2.0     09-10          ICU Vital Signs Last 24 Hrs  T(C): 36.8 (11 Sep 2024 05:55), Max: 36.9 (10 Sep 2024 20:44)  T(F): 98.3 (11 Sep 2024 05:55), Max: 98.4 (10 Sep 2024 20:44)  HR: 93 (11 Sep 2024 09:00) (85 - 99)  BP: 127/83 (11 Sep 2024 09:00) (127/83 - 150/88)  BP(mean): --  ABP: --  ABP(mean): --  RR: 17 (11 Sep 2024 05:55) (17 - 18)  SpO2: 97% (11 Sep 2024 05:55) (97% - 98%)    O2 Parameters below as of 11 Sep 2024 05:55  Patient On (Oxygen Delivery Method): room air            RADIOLOGY    < from: MR Foot w/wo IV Cont, Right (09.02.24 @ 17:20) >  IMPRESSION:    1.  Peripherally enhancing 2.4 x 3.8 x 3.8 cm deep intramuscular abscess   along the dorsolateral margin of right forefoot overlying and   communicating with the third and fourth toe MTP joints.  2.  Hyperacute osteomyelitis/septic arthritis at right third and fourth   toe MTP joints.    --- End of Report ---    < end of copied text >    < from: CT Foot w/ Pt IV Cont, Right (09.01.24 @ 07:09) >    IMPRESSION:    1.  Ill-defined moderate volume abscess involving right second and third   distal intermetatarsal bursae and fourth MTP joint in the proper clinical   setting. No tracking emphysema.  2.  Consider follow-up MRI as clinically indicated.    --- End of Report ---    < end of copied text >    < from: Xray Foot AP + Lateral, Right (09.02.24 @ 08:47) >  IMPRESSION: Radiodense debris seen adjacent to the fifth MTP joint with   chronic appearing erosive change of the fifth metatarsal head and   deformity of the proximal phalanx. Correlation with prior injury is   recommended.    There is diffuse dorsal soft tissue swelling.    --- End of Report ---    < end of copied text >    < from: MR Foot w/wo IV Cont, Right (09.02.24 @ 17:20) >  IMPRESSION:    1.  Peripherally enhancing 2.4 x 3.8 x 3.8 cm deep intramuscular abscess   along the dorsolateral margin of right forefoot overlying and   communicating with the third and fourth toe MTP joints.  2.  Hyperacute osteomyelitis/septic arthritis at right third and fourth   toe MTP joints.    --- End of Report ---    < end of copied text >      MICROBIOLOGY  Culture - Wound Aerobic/Anaerobic (09.02.24 @ 13:44)    -  Clindamycin: S 0.5   -  Erythromycin: R >4   -  Gentamicin: S <=1 Should not be used as monotherapy   -  Linezolid: S 2   -  Oxacillin: R >2   -  Penicillin: R >8   -  Rifampin: S <=1 Should not be used as monotherapy   -  Tetracycline: R >8   -  Trimethoprim/Sulfamethoxazole: S <=0.5/9.5   -  Vancomycin: S 1   Specimen Source: Swab Rt. foot Wound   Culture Results:   Numerous Methicillin Resistant Staphylococcus aureus   Organism Identification: Methicillin resistant Staphylococcus aureus   Organism: Methicillin resistant Staphylococcus aureus   Method Type: KEILY          PHYSICAL EXAM  Lower Extremity Focused  Vasc: DP, PT 2/4. TG warm to warm. Edema to R dorsal foot.   Derm: R dorsal foot wound at 3rd mtpj. Serosanguinous drainage. Negative fluctuance. Improving erythema. Right sub met 2 hyperkeratotic skin.   Neuro: Protective sensation intact.  MSK: negative to TTP

## 2024-09-11 NOTE — PROGRESS NOTE ADULT - ATTENDING COMMENTS
50 yo M with a PMH of HTN, lower extremity ulcers, presented with R foot pain and found to have ulcers on the foot, now with podiatry following s/p I&D. With improving ulcer and decreasing purulence, wound culture growing MRSA. On extended course of antibiotics for treatment of osteomyelitis, recently switched to daptomycin.     VS reviewed and stable     Exam:   Appears comfortable on room air   MMM  Normal WOB on RA, CTAB   RRR, no mrg   Abdomen soft, nontender, nondistended. Small hard protrusion in the LUQ of the abdomen, nontender.   Extremities warm and without edema, R foot bandage clean, dry, intact   AOX3, no focal neuro deficits     Labs this morning are stable.     Imaging reviewed, concerning for MRI foot showing intramuscular abscess in R foot along with 3rd and 4th metatarsal possible OM.   US obtained to investigate bump in LUQ with suspicion for bone/cartilage protrusion.     Plan:   -Continue daptomycin through early October 10/12 (recently switched from vancomycin due to volatility in required dosing based on levels)   -Appreciate podiatry involvement, to follow up any additional recommendations.   -Can continue low dose nifedipine for blood pressure  -CT abdomen to further evaluate possible bone/cartilage lesion, will discuss inpatient vs outpatient     Rest as per resident note.

## 2024-09-11 NOTE — DISCHARGE NOTE PROVIDER - ATTENDING DISCHARGE PHYSICAL EXAMINATION:
Attending Attestation - Patient seen and examined    Exam on day of discharge:  Appears comfortable   MMM  Normal WOB on RA, CTAB   RRR, no mrg   Abdomen soft, nontender, nondistended  Extremities warm and without edema. Small ulcer on dorsal surface of the R foot, improved from prior. Clean dressing in place.   AOX3, no focal neuro deficits     Med Changes: Continue course of daptomycin for foot infection. Start nifedipine 30 mg.   Pending labs/tests: None  Provider F/U: PCP, podiatry, ID, cardiology   Recommendations for outpatient: Please see above.     Patient stable for discharge to [facility].    31 min spent on DC. Discussed with housestaff.    Thank you for allowing the [facility] to care for your patient. For any questions after your discharge not answered by your primary care physician or providers you have been scheduled with for follow-up above, please call [phone] and ask to speak to the Senior House Officer

## 2024-09-11 NOTE — DISCHARGE NOTE PROVIDER - NSDCCPCAREPLAN_GEN_ALL_CORE_FT
PRINCIPAL DISCHARGE DIAGNOSIS  Diagnosis: Acute osteomyelitis  Assessment and Plan of Treatment: You were found to have osteomyelitis of your right foot. Osteomyelitis is an inflammation or swelling of bone tissue that is usually the result of an infection. Bone infection may occur for many different reasons and can affect children or adults. Osteomyelitis may occur as a result of a bacterial bloodstream infection, sometimes called bacteremia, or sepsis, that spreads to the bone.  Osteomyelitis can also occur from a nearby infection due to a traumatic injury, frequent medication injections, a surgical procedure. In the hospital, you were treated with antibiotics and wound care. Patient was medically optimized, stable and ready for discharge. Plan of care and return precautions were discussed with the patient who verbally stated understanding.  -------------------------------------------------------------------------  Continue daptomycin 500mg every 24 hours until 10/12/2024  Please follow up with infectious disease outpatient- office will reach out to you to schedule an appointment      SECONDARY DISCHARGE DIAGNOSES  Diagnosis: HTN (hypertension)  Assessment and Plan of Treatment: You were also found to have hypertension, also known as high blood pressure. High blood pressure is a common condition that affects the body's arteries.  If you have high blood pressure, the force of the blood pushing against the artery walls is consistently too high. The heart has to work harder to pump blood. Untreated, high blood pressure increases the risk of heart attack, stroke and other serious health problems. It's important to have your blood pressure checked at least every two years starting at age 18. Some people need more-frequent checks. You were started on nifedipine 30mg once daily for your blood pressure.     PRINCIPAL DISCHARGE DIAGNOSIS  Diagnosis: Acute osteomyelitis  Assessment and Plan of Treatment: You were found to have osteomyelitis of your right foot. Osteomyelitis is an inflammation or swelling of bone tissue that is usually the result of an infection. Bone infection may occur for many different reasons and can affect children or adults. Osteomyelitis may occur as a result of a bacterial bloodstream infection, sometimes called bacteremia, or sepsis, that spreads to the bone.  Osteomyelitis can also occur from a nearby infection due to a traumatic injury, frequent medication injections, a surgical procedure. In the hospital, you were treated with antibiotics and wound care. Patient was medically optimized, stable and ready for discharge. Plan of care and return precautions were discussed with the patient who verbally stated understanding.  -------------------------------------------------------------------------  Continue daptomycin 500mg every 24 hours until 10/12/2024  Please follow up with infectious disease outpatient- office will reach out to you to schedule an appointment      SECONDARY DISCHARGE DIAGNOSES  Diagnosis: HTN (hypertension)  Assessment and Plan of Treatment: You were also found to have hypertension, also known as high blood pressure. High blood pressure is a common condition that affects the body's arteries.  If you have high blood pressure, the force of the blood pushing against the artery walls is consistently too high. The heart has to work harder to pump blood. Untreated, high blood pressure increases the risk of heart attack, stroke and other serious health problems. It's important to have your blood pressure checked at least every two years starting at age 18. Some people need more-frequent checks. You were started on nifedipine 30mg once daily for your blood pressure. You had an echocardiogram done which showed mild aortic regurgitation and left ventricular hypertrophy.  --------------------------------------------------------------------------------  Please follow up with cardiology Dr. Richard Becerril on 9/24/2024 at 1:15pm    Diagnosis: Lung nodule  Assessment and Plan of Treatment: A lung (pulmonary) nodule is an abnormal growth that forms in a lung. Nodules may develop in one lung or both. About 95% of lung nodules are benign. Many things can cause benign lung nodules, including infections and scarring. If you have a pulmonary nodule, your healthcare provider may want to perform additional tests to determine the cause and rule out lung cancer. You had a CT scan which revealed a 3 mm nodule in your left lung. A repeat CT scan of the chest is recommended in 1 year. Please follow up with your PCP for further evaluation. You have an appointment scheduled with the [facility] on 9/19/2024 at 12pm.     PRINCIPAL DISCHARGE DIAGNOSIS  Diagnosis: Acute osteomyelitis  Assessment and Plan of Treatment: You were found to have osteomyelitis of your right foot. Osteomyelitis is an inflammation or swelling of bone tissue that is usually the result of an infection. Bone infection may occur for many different reasons and can affect children or adults. Osteomyelitis may occur as a result of a bacterial bloodstream infection, sometimes called bacteremia, or sepsis, that spreads to the bone.  Osteomyelitis can also occur from a nearby infection due to a traumatic injury, frequent medication injections, a surgical procedure. In the hospital, you were treated with antibiotics and wound care. Patient was medically optimized, stable and ready for discharge. Plan of care and return precautions were discussed with the patient who verbally stated understanding.  -------------------------------------------------------------------------  Continue daptomycin 500mg every 24 hours until 10/12/2024  Please follow up with infectious disease outpatient- office will reach out to you to schedule an appointment.  Patient will need weekly CK faxed to Dr. Norwood's office.  Office phone number is [phone]      SECONDARY DISCHARGE DIAGNOSES  Diagnosis: HTN (hypertension)  Assessment and Plan of Treatment: You were also found to have hypertension, also known as high blood pressure. High blood pressure is a common condition that affects the body's arteries.  If you have high blood pressure, the force of the blood pushing against the artery walls is consistently too high. The heart has to work harder to pump blood. Untreated, high blood pressure increases the risk of heart attack, stroke and other serious health problems. It's important to have your blood pressure checked at least every two years starting at age 18. Some people need more-frequent checks. You were started on nifedipine 30mg once daily for your blood pressure. You had an echocardiogram done which showed mild aortic regurgitation and left ventricular hypertrophy.  --------------------------------------------------------------------------------  Please follow up with cardiology Dr. Richard Becerril on 9/24/2024 at 1:15pm    Diagnosis: Lung nodule  Assessment and Plan of Treatment: A lung (pulmonary) nodule is an abnormal growth that forms in a lung. Nodules may develop in one lung or both. About 95% of lung nodules are benign. Many things can cause benign lung nodules, including infections and scarring. If you have a pulmonary nodule, your healthcare provider may want to perform additional tests to determine the cause and rule out lung cancer. You had a CT scan which revealed a 3 mm nodule in your left lung. A repeat CT scan of the chest is recommended in 1 year. Please follow up with your PCP for further evaluation. You have an appointment scheduled with the [facility] on 9/19/2024 at 12pm.     PRINCIPAL DISCHARGE DIAGNOSIS  Diagnosis: Acute osteomyelitis  Assessment and Plan of Treatment: You were found to have osteomyelitis of your right foot. Osteomyelitis is an inflammation or swelling of bone tissue that is usually the result of an infection. Bone infection may occur for many different reasons and can affect children or adults. Osteomyelitis may occur as a result of a bacterial bloodstream infection, sometimes called bacteremia, or sepsis, that spreads to the bone.  Osteomyelitis can also occur from a nearby infection due to a traumatic injury, frequent medication injections, a surgical procedure. In the hospital, you were treated with antibiotics and wound care. Patient was medically optimized, stable and ready for discharge. Plan of care and return precautions were discussed with the patient who verbally stated understanding.  -------------------------------------------------------------------------  Continue daptomycin 500mg every 24 hours until 10/12/2024  Please follow up with infectious disease outpatient- office will reach out to you to schedule an appointment.  Patient will need weekly CK faxed to Dr. Norwood's office.  Office phone number is [phone]  Please follow up with a podiatrist. Please call to schedule an appointment.  [facility]   Address: [address] [place] [address]  Phone: [phone]      SECONDARY DISCHARGE DIAGNOSES  Diagnosis: HTN (hypertension)  Assessment and Plan of Treatment: You were also found to have hypertension, also known as high blood pressure. High blood pressure is a common condition that affects the body's arteries.  If you have high blood pressure, the force of the blood pushing against the artery walls is consistently too high. The heart has to work harder to pump blood. Untreated, high blood pressure increases the risk of heart attack, stroke and other serious health problems. It's important to have your blood pressure checked at least every two years starting at age 18. Some people need more-frequent checks. You were started on nifedipine 30mg once daily for your blood pressure. You had an echocardiogram done which showed mild aortic regurgitation and left ventricular hypertrophy.  --------------------------------------------------------------------------------  Please follow up with cardiology Dr. Richard Becerril on 9/24/2024 at 1:15pm    Diagnosis: Lung nodule  Assessment and Plan of Treatment: A lung (pulmonary) nodule is an abnormal growth that forms in a lung. Nodules may develop in one lung or both. About 95% of lung nodules are benign. Many things can cause benign lung nodules, including infections and scarring. If you have a pulmonary nodule, your healthcare provider may want to perform additional tests to determine the cause and rule out lung cancer. You had a CT scan which revealed a 3 mm nodule in your left lung. A repeat CT scan of the chest is recommended in 1 year. Please follow up with your PCP for further evaluation. You have an appointment scheduled with the [facility] on 9/19/2024 at 12pm.

## 2024-09-11 NOTE — PROGRESS NOTE ADULT - SUBJECTIVE AND OBJECTIVE BOX
INTERVAL/OVERNIGHT EVENTS: None    SUBJECTIVE: Patient seen and examined at bedside, comfortable, NAD. Denied fever, chest pain, dyspnea, abdominal pain.     Vital Signs Last 12 Hrs  T(F): 98.3 (09-11-24 @ 05:55), Max: 98.4 (09-10-24 @ 20:44)  HR: 96 (09-11-24 @ 05:55) (96 - 99)  BP: 134/93 (09-11-24 @ 05:55) (134/93 - 142/90)  BP(mean): --  RR: 17 (09-11-24 @ 05:55) (17 - 18)  SpO2: 97% (09-11-24 @ 05:55) (97% - 97%)  I&O's Summary      PHYSICAL EXAM:  General: NAD  HEENT: PERRL, EOM intact, sclera anicteric, MMM  Cardiovascular: RRR; no MRG;   Respiratory: CTAB; no WRR  GI/: soft; NTND; BS x4  Extremities: WWP; 2+ peripheral pulses bilaterally; no LE edema, Rfoot wrapped  Skin: normal color & turgor; no rash  Neurologic: aox3; no focal deficits    LABS:                        13.0   9.54  )-----------( 556      ( 10 Sep 2024 09:11 )             40.6     09-10    138  |  102  |  18  ----------------------------<  89  4.6   |  27  |  0.87    Ca    9.0      10 Sep 2024 09:11  Phos  4.5     09-10  Mg     2.0     09-10      Urinalysis Basic - ( 10 Sep 2024 09:11 )    Color: x / Appearance: x / SG: x / pH: x  Gluc: 89 mg/dL / Ketone: x  / Bili: x / Urobili: x   Blood: x / Protein: x / Nitrite: x   Leuk Esterase: x / RBC: x / WBC x   Sq Epi: x / Non Sq Epi: x / Bacteria: x        RADIOLOGY & ADDITIONAL TESTS:    MEDICATIONS  (STANDING):  acetaminophen     Tablet .. 650 milliGRAM(s) Oral every 6 hours  DAPTOmycin IVPB 500 milliGRAM(s) IV Intermittent every 24 hours  enoxaparin Injectable 40 milliGRAM(s) SubCutaneous every 24 hours  lidocaine   4% Patch 1 Patch Transdermal daily  lidocaine 1% Injectable 20 milliLiter(s) Local Injection once  multivitamin 1 Tablet(s) Oral daily  naloxone Injectable 0.4 milliGRAM(s) IV Push once  NIFEdipine XL 30 milliGRAM(s) Oral daily  polyethylene glycol 3350 17 Gram(s) Oral daily  senna 2 Tablet(s) Oral at bedtime    MEDICATIONS  (PRN):  aluminum hydroxide/magnesium hydroxide/simethicone Suspension 30 milliLiter(s) Oral every 4 hours PRN Dyspepsia  bisacodyl 5 milliGRAM(s) Oral daily PRN Constipation  ibuprofen  Tablet. 400 milliGRAM(s) Oral every 6 hours PRN Moderate Pain (4 - 6)  melatonin 3 milliGRAM(s) Oral at bedtime PRN Insomnia  ondansetron Injectable 4 milliGRAM(s) IV Push every 8 hours PRN Nausea and/or Vomiting

## 2024-09-11 NOTE — DISCHARGE NOTE PROVIDER - NSDCMRMEDTOKEN_GEN_ALL_CORE_FT
DAPTOmycin 500 mg intravenous injection: 500 milligram(s) intravenous every 24 hours  NIFEdipine 30 mg oral tablet, extended release: 1 tab(s) orally once a day   chlorhexidine 2% topical pad: 1 application once a day 1 Apply topically to affected area  DAPTOmycin 500 mg intravenous injection: 500 milligram(s) intravenous every 24 hours  NIFEdipine 30 mg oral tablet, extended release: 1 tab(s) orally once a day  sodium chloride 0.9% injectable solution: 1 application injectable once a day

## 2024-09-11 NOTE — DISCHARGE NOTE PROVIDER - CARE PROVIDER_API CALL
Renee Norwood  Infectious Disease  [address]  [place] [address]  Phone: [phone]  Fax: [phone]  Follow Up Time:    Renee Norwood  Infectious Disease  [address]  [place] [address]  Phone: [phone]  Fax: [phone]  Follow Up Time:     Tim Stevenson)  Internal Medicine  [address]  [place] [address]  Phone: [phone]  Fax: [phone]  Scheduled Appointment: 09/19/2024 12:00 PM   Tim Stevenson)  Internal Medicine  [address]  [place] [address]  Phone: [phone]  Fax: [phone]  Scheduled Appointment: 09/19/2024 12:00 PM    Richard Becerril  Cardiovascular Disease  [address] [address]  Phone: [phone]  Fax: [phone]  Scheduled Appointment: 09/24/2024 01:15 PM    Renee Norwood  Infectious Disease  [address]  [place] [address]  Phone: [phone]  Fax: [phone]  Follow Up Time:

## 2024-09-11 NOTE — PROGRESS NOTE ADULT - PROBLEM SELECTOR PLAN 5
pt with lump in LUQ, mobile and nontender    9/10 US abdomen: Palpable lump in the left upper quadrant is likely due to protuberant or enlarged costochondral junction.     Plan:  CT abdomen/pelvis to further assess the lump (inpatient vs outpatient)  CTM pt with lump in LUQ, mobile and nontender  9/9 US abdomen: Palpable lump in the left upper quadrant is likely due to protuberant or enlarged costochondral junction.     Plan:  CT chest to further assess the lump, r/o expansive bone lesion

## 2024-09-12 ENCOUNTER — TRANSCRIPTION ENCOUNTER (OUTPATIENT)
Age: 49
End: 2024-09-12

## 2024-09-12 ENCOUNTER — NON-APPOINTMENT (OUTPATIENT)
Age: 49
End: 2024-09-12

## 2024-09-12 VITALS
TEMPERATURE: 98 F | HEART RATE: 87 BPM | RESPIRATION RATE: 18 BRPM | DIASTOLIC BLOOD PRESSURE: 88 MMHG | SYSTOLIC BLOOD PRESSURE: 128 MMHG | OXYGEN SATURATION: 96 %

## 2024-09-12 DIAGNOSIS — L08.9 LOCAL INFECTION OF THE SKIN AND SUBCUTANEOUS TISSUE, UNSPECIFIED: ICD-10-CM

## 2024-09-12 PROBLEM — Z00.00 ENCOUNTER FOR PREVENTIVE HEALTH EXAMINATION: Status: ACTIVE | Noted: 2024-09-12

## 2024-09-12 PROCEDURE — 82962 GLUCOSE BLOOD TEST: CPT

## 2024-09-12 PROCEDURE — 86901 BLOOD TYPING SEROLOGIC RH(D): CPT

## 2024-09-12 PROCEDURE — 85025 COMPLETE CBC W/AUTO DIFF WBC: CPT

## 2024-09-12 PROCEDURE — 71250 CT THORAX DX C-: CPT | Mod: MC

## 2024-09-12 PROCEDURE — 80202 ASSAY OF VANCOMYCIN: CPT

## 2024-09-12 PROCEDURE — 87040 BLOOD CULTURE FOR BACTERIA: CPT

## 2024-09-12 PROCEDURE — 93307 TTE W/O DOPPLER COMPLETE: CPT

## 2024-09-12 PROCEDURE — 96375 TX/PRO/DX INJ NEW DRUG ADDON: CPT

## 2024-09-12 PROCEDURE — 83036 HEMOGLOBIN GLYCOSYLATED A1C: CPT

## 2024-09-12 PROCEDURE — 86900 BLOOD TYPING SEROLOGIC ABO: CPT

## 2024-09-12 PROCEDURE — 82550 ASSAY OF CK (CPK): CPT

## 2024-09-12 PROCEDURE — 86850 RBC ANTIBODY SCREEN: CPT

## 2024-09-12 PROCEDURE — 73720 MRI LWR EXTREMITY W/O&W/DYE: CPT | Mod: MC

## 2024-09-12 PROCEDURE — 83605 ASSAY OF LACTIC ACID: CPT

## 2024-09-12 PROCEDURE — 84100 ASSAY OF PHOSPHORUS: CPT

## 2024-09-12 PROCEDURE — 80053 COMPREHEN METABOLIC PANEL: CPT

## 2024-09-12 PROCEDURE — 80048 BASIC METABOLIC PNL TOTAL CA: CPT

## 2024-09-12 PROCEDURE — 73701 CT LOWER EXTREMITY W/DYE: CPT | Mod: MC

## 2024-09-12 PROCEDURE — A9585: CPT

## 2024-09-12 PROCEDURE — 76705 ECHO EXAM OF ABDOMEN: CPT

## 2024-09-12 PROCEDURE — 96374 THER/PROPH/DIAG INJ IV PUSH: CPT

## 2024-09-12 PROCEDURE — 36410 VNPNXR 3YR/> PHY/QHP DX/THER: CPT

## 2024-09-12 PROCEDURE — 86140 C-REACTIVE PROTEIN: CPT

## 2024-09-12 PROCEDURE — 83735 ASSAY OF MAGNESIUM: CPT

## 2024-09-12 PROCEDURE — 73620 X-RAY EXAM OF FOOT: CPT

## 2024-09-12 PROCEDURE — 85652 RBC SED RATE AUTOMATED: CPT

## 2024-09-12 PROCEDURE — 76937 US GUIDE VASCULAR ACCESS: CPT

## 2024-09-12 PROCEDURE — 87070 CULTURE OTHR SPECIMN AEROBIC: CPT

## 2024-09-12 PROCEDURE — 99239 HOSP IP/OBS DSCHRG MGMT >30: CPT | Mod: GC

## 2024-09-12 PROCEDURE — 76937 US GUIDE VASCULAR ACCESS: CPT | Mod: 26

## 2024-09-12 PROCEDURE — 80307 DRUG TEST PRSMV CHEM ANLYZR: CPT

## 2024-09-12 PROCEDURE — 87186 SC STD MICRODIL/AGAR DIL: CPT

## 2024-09-12 PROCEDURE — 81001 URINALYSIS AUTO W/SCOPE: CPT

## 2024-09-12 PROCEDURE — 36415 COLL VENOUS BLD VENIPUNCTURE: CPT

## 2024-09-12 PROCEDURE — 87077 CULTURE AEROBIC IDENTIFY: CPT

## 2024-09-12 PROCEDURE — 87389 HIV-1 AG W/HIV-1&-2 AB AG IA: CPT

## 2024-09-12 PROCEDURE — 99285 EMERGENCY DEPT VISIT HI MDM: CPT | Mod: 25

## 2024-09-12 RX ORDER — NIFEDIPINE 60 MG/1
1 TABLET, FILM COATED, EXTENDED RELEASE ORAL
Qty: 0 | Refills: 0 | DISCHARGE
Start: 2024-09-12

## 2024-09-12 RX ORDER — SODIUM CHLORIDE 9 MG/ML
1 INJECTION INTRAMUSCULAR; INTRAVENOUS; SUBCUTANEOUS
Qty: 0 | Refills: 0 | DISCHARGE
Start: 2024-09-12

## 2024-09-12 RX ORDER — SODIUM CHLORIDE 9 MG/ML
10 INJECTION INTRAMUSCULAR; INTRAVENOUS; SUBCUTANEOUS
Refills: 0 | Status: DISCONTINUED | OUTPATIENT
Start: 2024-09-12 | End: 2024-09-12

## 2024-09-12 RX ORDER — DAPTOMYCIN 500 MG/10ML
500 INJECTION, POWDER, LYOPHILIZED, FOR SOLUTION INTRAVENOUS
Qty: 0 | Refills: 0 | DISCHARGE
Start: 2024-09-12

## 2024-09-12 RX ORDER — CHLORHEXIDINE GLUCONATE 40 MG/ML
1 SOLUTION TOPICAL
Qty: 0 | Refills: 0 | DISCHARGE
Start: 2024-09-12

## 2024-09-12 RX ORDER — CHLORHEXIDINE GLUCONATE 40 MG/ML
1 SOLUTION TOPICAL
Refills: 0 | Status: DISCONTINUED | OUTPATIENT
Start: 2024-09-12 | End: 2024-09-12

## 2024-09-12 RX ADMIN — ACETAMINOPHEN 650 MILLIGRAM(S): 325 TABLET ORAL at 05:54

## 2024-09-12 RX ADMIN — ACETAMINOPHEN 650 MILLIGRAM(S): 325 TABLET ORAL at 12:03

## 2024-09-12 RX ADMIN — ACETAMINOPHEN 650 MILLIGRAM(S): 325 TABLET ORAL at 13:03

## 2024-09-12 RX ADMIN — NIFEDIPINE 30 MILLIGRAM(S): 60 TABLET, FILM COATED, EXTENDED RELEASE ORAL at 12:04

## 2024-09-12 RX ADMIN — Medication 1 TABLET(S): at 12:04

## 2024-09-12 NOTE — DISCHARGE NOTE NURSING/CASE MANAGEMENT/SOCIAL WORK - PATIENT PORTAL LINK FT
You can access the FollowMyHealth Patient Portal offered by [facility] by registering at the following website: [email or web address]. By joining [facility]’s FollowMyHealth portal, you will also be able to view your health information using other applications (apps) compatible with our system.

## 2024-09-12 NOTE — PROGRESS NOTE ADULT - REASON FOR ADMISSION
Sepsis 2/2 R foot cellulitis

## 2024-09-12 NOTE — PROGRESS NOTE ADULT - PROVIDER SPECIALTY LIST ADULT
Infectious Disease
Infectious Disease
Internal Medicine
Infectious Disease
Podiatry
Infectious Disease
Internal Medicine
Internal Medicine
Podiatry
Infectious Disease
Internal Medicine
Podiatry
Internal Medicine
Hospitalist
Internal Medicine
Internal Medicine
Yes

## 2024-09-12 NOTE — PROGRESS NOTE ADULT - ATTENDING COMMENTS
48 yo M with a PMH of HTN, lower extremity ulcers, presented with R foot pain and found to have ulcers on the foot, now with podiatry following s/p I&D. With improving ulcer and decreasing purulence, wound culture growing MRSA. On extended course of antibiotics for treatment of osteomyelitis, recently switched to daptomycin.     VS reviewed and stable     Exam:   Appears comfortable on room air   MMM  Normal WOB on RA, CTAB   RRR, no mrg   Abdomen soft, nontender, nondistended. Small hard protrusion in the LUQ of the abdomen, nontender.   Extremities warm and without edema, R foot bandage clean, dry, intact   AOX3, no focal neuro deficits     Lab holiday.    Imaging reviewed, concerning for MRI foot showing intramuscular abscess in R foot along with 3rd and 4th metatarsal possible OM.   CT chest with small 3 mm pulmonary nodule, no evidence of bony abnormality.     Plan:   -Continue daptomycin through early October 10/12 (recently switched from vancomycin due to volatility in required dosing based on levels)   -Appreciate podiatry involvement, to follow up any additional recommendations.   -Can continue low dose nifedipine for blood pressure  -Follow up CT chest recommended in 1 yr for pulmonary nodule, will add to discharge instructions     Rest as per resident note.

## 2024-09-12 NOTE — PROGRESS NOTE ADULT - PROBLEM SELECTOR PLAN 1
Pt meets criteria for sepsis (SIRS 2/4 (WBC>12, HR>90), with SBP drop > 40 2/2 sepsis  Source: R foot cellulitis) s/p clindamycin in the ED. Recently d/c from [facility] on Cephalexin 500mg  Currently WWP, mentating well, and making urine.    9/1: CT Foot:  Ill-defined moderate volume abscess involving right second and third distal intermetatarsal bursae and fourth MTP joint in the proper clinical setting. No tracking emphysema.  ESR: 68 | .6   9/2 X-ray Foot: Radiodense debris seen adjacent to the fifth MTP joint with chronic appearing erosive change of the fifth metatarsal head and deformity of the proximal phalanx.  9/2 MRI Foot: Hyperacute osteomyelitis/septic arthritis at right third and fourth toe MTP joints. Peripherally enhancing 2.4 x 3.8 x 3.8 cm deep intramuscular abscess.  UA: Negative   Bcx: NGTD  Wound culture: MRSA  d/hugh unasyn 09/4  d/hugh vanc 9/9 due to inconsistent vanc trough drawings and levels  Plan:  c/w daptomycin 500mg q24h (9/1 -10/12  obtain CK once weekly   Podiatry consulted, appreciate recs  ID consulted, appreciate recs

## 2024-09-12 NOTE — PROGRESS NOTE ADULT - PROBLEM SELECTOR PLAN 5
pt with lump in LUQ, mobile and nontender  9/9 US abdomen: Palpable lump in the left upper quadrant is likely due to protuberant or enlarged costochondral junction.     Plan:  CT chest to further assess the lump, r/o expansive bone lesion

## 2024-09-12 NOTE — PROCEDURE NOTE - NSPOSTCAREGUIDE_GEN_A_CORE
Verbal/written post procedure instructions were given to patient/caregiver/Instructed patient/caregiver regarding signs and symptoms of infection/Care for catheter as per unit/ICU protocols
Verbal/written post procedure instructions were given to patient/caregiver

## 2024-09-12 NOTE — PROGRESS NOTE ADULT - PROBLEM SELECTOR PLAN 8
F: tolerating PO  E: replete K<4, Mg<2  N: Regular  VTE Prophylaxis: Lovenox 40mg q24h  D: RMF
F: tolerating PO  E: replete K<4, Mg<2  N: Regular  VTE Prophylaxis: Lovenox 40mg q24h  D: RMF

## 2024-09-12 NOTE — PROGRESS NOTE ADULT - SUBJECTIVE AND OBJECTIVE BOX
**INCOMPLETE NOTE    OVERNIGHT EVENTS:    SUBJECTIVE:  Patient seen and examined at bedside.    Vital Signs Last 12 Hrs  T(F): 98.3 (09-12-24 @ 06:02), Max: 98.8 (09-11-24 @ 21:42)  HR: 104 (09-12-24 @ 06:02) (99 - 104)  BP: 131/86 (09-12-24 @ 06:02) (130/93 - 131/86)  BP(mean): --  RR: 18 (09-12-24 @ 06:02) (17 - 18)  SpO2: 97% (09-12-24 @ 06:02) (96% - 97%)  I&O's Summary      PHYSICAL EXAM:  Constitutional: NAD, comfortable in bed.  HEENT: NC/AT, PERRLA, EOMI, no conjunctival pallor or scleral icterus, MMM  Neck: Supple, no JVD  Respiratory: CTA B/L. No w/r/r.   Cardiovascular: RRR, normal S1 and S2, no m/r/g.   Gastrointestinal: +BS, soft NTND, no guarding or rebound tenderness, no palpable masses   Extremities: wwp; no cyanosis, clubbing or edema.   Vascular: Pulses equal and strong throughout.   Neurological: AAOx3, no CN deficits, strength and sensation intact throughout.   Skin: No gross skin abnormalities or rashes        LABS:                        13.0   9.54  )-----------( 556      ( 10 Sep 2024 09:11 )             40.6     09-10    138  |  102  |  18  ----------------------------<  89  4.6   |  27  |  0.87    Ca    9.0      10 Sep 2024 09:11  Phos  4.5     09-10  Mg     2.0     09-10        Urinalysis Basic - ( 10 Sep 2024 09:11 )    Color: x / Appearance: x / SG: x / pH: x  Gluc: 89 mg/dL / Ketone: x  / Bili: x / Urobili: x   Blood: x / Protein: x / Nitrite: x   Leuk Esterase: x / RBC: x / WBC x   Sq Epi: x / Non Sq Epi: x / Bacteria: x          RADIOLOGY & ADDITIONAL TESTS:    MEDICATIONS  (STANDING):  acetaminophen     Tablet .. 650 milliGRAM(s) Oral every 6 hours  DAPTOmycin IVPB 500 milliGRAM(s) IV Intermittent every 24 hours  lidocaine   4% Patch 1 Patch Transdermal daily  lidocaine 1% Injectable 20 milliLiter(s) Local Injection once  multivitamin 1 Tablet(s) Oral daily  naloxone Injectable 0.4 milliGRAM(s) IV Push once  NIFEdipine XL 30 milliGRAM(s) Oral daily  polyethylene glycol 3350 17 Gram(s) Oral daily  senna 2 Tablet(s) Oral at bedtime    MEDICATIONS  (PRN):  aluminum hydroxide/magnesium hydroxide/simethicone Suspension 30 milliLiter(s) Oral every 4 hours PRN Dyspepsia  bisacodyl 5 milliGRAM(s) Oral daily PRN Constipation  melatonin 3 milliGRAM(s) Oral at bedtime PRN Insomnia

## 2024-09-12 NOTE — PROGRESS NOTE ADULT - NUTRITIONAL ASSESSMENT
This patient has been assessed with a concern for Malnutrition and has been determined to have a diagnosis/diagnoses of Severe protein-calorie malnutrition.    This patient is being managed with:   Diet DASH/TLC-  Sodium & Cholesterol Restricted  Supplement Feeding Modality:  Oral  Ensure Muscle Health Cans or Servings Per Day:  1       Frequency:  Three Times a day  Entered: Sep  4 2024  3:43PM  
This patient has been assessed with a concern for Malnutrition and has been determined to have a diagnosis/diagnoses of Severe protein-calorie malnutrition.    This patient is being managed with:   Diet Regular-  Supplement Feeding Modality:  Oral  Ensure Plus High Protein Cans or Servings Per Day:  1       Frequency:  Three Times a day  Entered: Sep  9 2024  3:32PM  
This patient has been assessed with a concern for Malnutrition and has been determined to have a diagnosis/diagnoses of Severe protein-calorie malnutrition.    This patient is being managed with:   Diet DASH/TLC-  Sodium & Cholesterol Restricted  Supplement Feeding Modality:  Oral  Ensure Muscle Health Cans or Servings Per Day:  1       Frequency:  Three Times a day  Entered: Sep  4 2024  3:43PM  
This patient has been assessed with a concern for Malnutrition and has been determined to have a diagnosis/diagnoses of Severe protein-calorie malnutrition.    This patient is being managed with:   Diet DASH/TLC-  Sodium & Cholesterol Restricted  Supplement Feeding Modality:  Oral  Ensure Muscle Health Cans or Servings Per Day:  1       Frequency:  Three Times a day  Entered: Sep  4 2024  3:43PM  
This patient has been assessed with a concern for Malnutrition and has been determined to have a diagnosis/diagnoses of Severe protein-calorie malnutrition.    This patient is being managed with:   Diet Regular-  Supplement Feeding Modality:  Oral  Ensure Plus High Protein Cans or Servings Per Day:  1       Frequency:  Three Times a day  Entered: Sep  9 2024  3:32PM  
This patient has been assessed with a concern for Malnutrition and has been determined to have a diagnosis/diagnoses of Severe protein-calorie malnutrition.    This patient is being managed with:   Diet DASH/TLC-  Sodium & Cholesterol Restricted  Supplement Feeding Modality:  Oral  Ensure Muscle Health Cans or Servings Per Day:  1       Frequency:  Three Times a day  Entered: Sep  4 2024  3:43PM  
This patient has been assessed with a concern for Malnutrition and has been determined to have a diagnosis/diagnoses of Severe protein-calorie malnutrition.    This patient is being managed with:   Diet DASH/TLC-  Sodium & Cholesterol Restricted  Supplement Feeding Modality:  Oral  Ensure Muscle Health Cans or Servings Per Day:  1       Frequency:  Three Times a day  Entered: Sep  4 2024  3:43PM  
This patient has been assessed with a concern for Malnutrition and has been determined to have a diagnosis/diagnoses of Severe protein-calorie malnutrition.    This patient is being managed with:   Diet Regular-  Supplement Feeding Modality:  Oral  Ensure Plus High Protein Cans or Servings Per Day:  1       Frequency:  Three Times a day  Entered: Sep  9 2024  3:32PM

## 2024-09-12 NOTE — PROGRESS NOTE ADULT - TIME BILLING
Bedside exam and interview   Reviewed vitals, labs   Discussed patient's plan of care with housestaff   Documentation of encounter  Excludes teaching and separately reported services
Evaluation of patient, review of charts, d/w SW
Bedside exam and interview   Reviewed vitals, labs   Discussed patient's plan of care with housestaff   Documentation of encounter  Excludes teaching and separately reported services
Time exclusive of ACP discussion  Time inclusive of chart review, medication ordering, discussion with primary team, clinical documentation, and communication with family/caregiver
Bedside exam and interview   Reviewed vitals, labs   Discussed patient's plan of care with housestaff   Documentation of encounter  Excludes teaching and separately reported services
Bedside exam and interview   Reviewed vitals, labs   Discussed patient's plan of care with housestaff   Documentation of encounter  Excludes teaching and separately reported services

## 2024-09-12 NOTE — PROGRESS NOTE ADULT - PROBLEM SELECTOR PLAN 2
9/1: CT Foot:  Ill-defined moderate volume abscess involving right second and third distal intermetatarsal bursae and fourth MTP joint in the proper clinical setting. No tracking emphysema.  ESR: 68 | .6   9/2 X-ray Foot: Radiodense debris seen adjacent to the fifth MTP joint with chronic appearing erosive change of the fifth metatarsal head and deformity of the proximal phalanx.  9/2 MRI Foot: Hyperacute osteomyelitis/septic arthritis at right third and fourth   toe MTP joints. Peripherally enhancing 2.4 x 3.8 x 3.8 cm deep intramuscular abscess.    Plan:   -Continue with plan listed above ID

## 2024-09-12 NOTE — PROGRESS NOTE ADULT - PROBLEM SELECTOR PROBLEM 7
Drug abuse
Prophylactic measure
Drug abuse
Prophylactic measure

## 2024-09-12 NOTE — DISCHARGE NOTE NURSING/CASE MANAGEMENT/SOCIAL WORK - NSDCFUADDAPPT_GEN_ALL_CORE_FT
Please bring your Insurance card, Photo ID and Discharge paperwork to the following appointments:    (1) Please follow up with [facility] with Dr. Ruben Loya on behalf of Dr. Tim Stevenson at [address], [place] [address] on 9/19/2024 at 12:00pm.    Appointment was scheduled by Ms. SHIRA Mejias, Referral Coordinator.    (2) Please follow up with your Cardiology Provider, Dr. Richard Becerril at [address] [place] [address] on 9/24/2024 at 1:15pm.    Appointment was scheduled by Ms. SHIRA Mejias, Referral Coordinator.    (3) Please follow up with a podiatrist. Please call to schedule an appointment.  [facility]   Address: [address] [place] [address]  Phone: [phone]

## 2024-09-17 DIAGNOSIS — M79.671 PAIN IN RIGHT FOOT: ICD-10-CM

## 2024-09-17 DIAGNOSIS — L02.611 CUTANEOUS ABSCESS OF RIGHT FOOT: ICD-10-CM

## 2024-09-17 DIAGNOSIS — A41.9 SEPSIS, UNSPECIFIED ORGANISM: ICD-10-CM

## 2024-09-17 DIAGNOSIS — Z59.00 HOMELESSNESS UNSPECIFIED: ICD-10-CM

## 2024-09-17 DIAGNOSIS — B95.62 METHICILLIN RESISTANT STAPHYLOCOCCUS AUREUS INFECTION AS THE CAUSE OF DISEASES CLASSIFIED ELSEWHERE: ICD-10-CM

## 2024-09-17 DIAGNOSIS — J43.9 EMPHYSEMA, UNSPECIFIED: ICD-10-CM

## 2024-09-17 DIAGNOSIS — E43 UNSPECIFIED SEVERE PROTEIN-CALORIE MALNUTRITION: ICD-10-CM

## 2024-09-17 DIAGNOSIS — K25.9 GASTRIC ULCER, UNSPECIFIED AS ACUTE OR CHRONIC, WITHOUT HEMORRHAGE OR PERFORATION: ICD-10-CM

## 2024-09-17 DIAGNOSIS — M00.9 PYOGENIC ARTHRITIS, UNSPECIFIED: ICD-10-CM

## 2024-09-17 DIAGNOSIS — R19.00 INTRA-ABDOMINAL AND PELVIC SWELLING, MASS AND LUMP, UNSPECIFIED SITE: ICD-10-CM

## 2024-09-17 DIAGNOSIS — I16.0 HYPERTENSIVE URGENCY: ICD-10-CM

## 2024-09-17 DIAGNOSIS — M86.9 OSTEOMYELITIS, UNSPECIFIED: ICD-10-CM

## 2024-09-17 DIAGNOSIS — F10.239 ALCOHOL DEPENDENCE WITH WITHDRAWAL, UNSPECIFIED: ICD-10-CM

## 2024-09-17 DIAGNOSIS — F19.19 OTHER PSYCHOACTIVE SUBSTANCE ABUSE WITH UNSPECIFIED PSYCHOACTIVE SUBSTANCE-INDUCED DISORDER: ICD-10-CM

## 2024-09-17 DIAGNOSIS — R65.20 SEVERE SEPSIS WITHOUT SEPTIC SHOCK: ICD-10-CM

## 2024-09-17 DIAGNOSIS — I10 ESSENTIAL (PRIMARY) HYPERTENSION: ICD-10-CM

## 2024-09-17 DIAGNOSIS — L03.115 CELLULITIS OF RIGHT LOWER LIMB: ICD-10-CM

## 2024-09-17 SDOH — ECONOMIC STABILITY - HOUSING INSECURITY: HOMELESSNESS UNSPECIFIED: Z59.00

## 2024-09-19 ENCOUNTER — APPOINTMENT (OUTPATIENT)
Dept: INTERNAL MEDICINE | Facility: CLINIC | Age: 49
End: 2024-09-19

## 2024-09-24 ENCOUNTER — APPOINTMENT (OUTPATIENT)
Dept: HEART AND VASCULAR | Facility: CLINIC | Age: 49
End: 2024-09-24

## 2024-10-08 ENCOUNTER — APPOINTMENT (OUTPATIENT)
Dept: INFECTIOUS DISEASE | Facility: CLINIC | Age: 49
End: 2024-10-08

## 2024-10-15 ENCOUNTER — NON-APPOINTMENT (OUTPATIENT)
Age: 49
End: 2024-10-15

## 2024-10-15 ENCOUNTER — APPOINTMENT (OUTPATIENT)
Dept: HEART AND VASCULAR | Facility: CLINIC | Age: 49
End: 2024-10-15
Payer: MEDICAID

## 2024-10-15 VITALS
DIASTOLIC BLOOD PRESSURE: 94 MMHG | HEIGHT: 68 IN | HEART RATE: 97 BPM | WEIGHT: 161 LBS | BODY MASS INDEX: 24.4 KG/M2 | TEMPERATURE: 98.7 F | OXYGEN SATURATION: 96 % | SYSTOLIC BLOOD PRESSURE: 125 MMHG

## 2024-10-15 DIAGNOSIS — Z80.9 FAMILY HISTORY OF MALIGNANT NEOPLASM, UNSPECIFIED: ICD-10-CM

## 2024-10-15 DIAGNOSIS — Z82.49 FAMILY HISTORY OF ISCHEMIC HEART DISEASE AND OTHER DISEASES OF THE CIRCULATORY SYSTEM: ICD-10-CM

## 2024-10-15 DIAGNOSIS — Z87.891 PERSONAL HISTORY OF NICOTINE DEPENDENCE: ICD-10-CM

## 2024-10-15 DIAGNOSIS — Z78.9 OTHER SPECIFIED HEALTH STATUS: ICD-10-CM

## 2024-10-15 PROCEDURE — 93000 ELECTROCARDIOGRAM COMPLETE: CPT

## 2024-10-15 PROCEDURE — 99203 OFFICE O/P NEW LOW 30 MIN: CPT | Mod: 25

## 2024-10-15 RX ORDER — NIFEDIPINE 30 MG/1
30 TABLET, EXTENDED RELEASE ORAL DAILY
Refills: 0 | Status: ACTIVE | COMMUNITY

## 2024-10-15 RX ORDER — POVIDONE-IODINE 10 MG/ML
10 SOLUTION TOPICAL
Refills: 0 | Status: ACTIVE | COMMUNITY

## 2024-10-15 RX ORDER — CALCIUM ALGINATE 100 %
POWDER (GRAM) MISCELLANEOUS
Refills: 0 | Status: ACTIVE | COMMUNITY

## 2024-10-15 RX ORDER — DAPTOMYCIN IN SODIUM CHLORIDE 500 MG/50ML
500-0.9 INJECTION, SOLUTION INTRAVENOUS
Refills: 0 | Status: ACTIVE | COMMUNITY

## 2024-10-29 ENCOUNTER — APPOINTMENT (OUTPATIENT)
Dept: INFECTIOUS DISEASE | Facility: CLINIC | Age: 49
End: 2024-10-29
